# Patient Record
Sex: MALE | Race: WHITE | NOT HISPANIC OR LATINO | URBAN - METROPOLITAN AREA
[De-identification: names, ages, dates, MRNs, and addresses within clinical notes are randomized per-mention and may not be internally consistent; named-entity substitution may affect disease eponyms.]

---

## 2019-09-15 ENCOUNTER — EMERGENCY (EMERGENCY)
Facility: HOSPITAL | Age: 64
LOS: 0 days | Discharge: HOME | End: 2019-09-15
Payer: COMMERCIAL

## 2019-09-15 VITALS
TEMPERATURE: 98 F | HEART RATE: 88 BPM | SYSTOLIC BLOOD PRESSURE: 135 MMHG | HEIGHT: 67 IN | WEIGHT: 210.1 LBS | OXYGEN SATURATION: 97 % | RESPIRATION RATE: 16 BRPM | DIASTOLIC BLOOD PRESSURE: 87 MMHG
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
## 2019-09-15 DIAGNOSIS — S42.402A UNSPECIFIED FRACTURE OF LOWER END OF LEFT HUMERUS, INITIAL ENCOUNTER FOR CLOSED FRACTURE: ICD-10-CM

## 2019-09-15 DIAGNOSIS — I10 ESSENTIAL (PRIMARY) HYPERTENSION: ICD-10-CM

## 2019-09-15 DIAGNOSIS — M79.603 PAIN IN ARM, UNSPECIFIED: ICD-10-CM
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
## 2019-09-15 PROCEDURE — 73060 X-RAY EXAM OF HUMERUS: CPT | Mod: 26,LT

## 2019-09-15 PROCEDURE — 73090 X-RAY EXAM OF FOREARM: CPT | Mod: 26,LT

## 2019-09-15 PROCEDURE — 73070 X-RAY EXAM OF ELBOW: CPT | Mod: 26,76,LT

## 2019-09-15 PROCEDURE — 99284 EMERGENCY DEPT VISIT MOD MDM: CPT

## 2019-09-15 RX ORDER — HYDROMORPHONE HYDROCHLORIDE 2 MG/ML
1 INJECTION INTRAMUSCULAR; INTRAVENOUS; SUBCUTANEOUS ONCE
Refills: 0 | Status: DISCONTINUED | OUTPATIENT
Start: 2019-09-15 | End: 2019-09-15

## 2019-09-15 RX ORDER — DIAZEPAM 5 MG
5 TABLET ORAL ONCE
Refills: 0 | Status: DISCONTINUED | OUTPATIENT
Start: 2019-09-15 | End: 2019-09-15

## 2019-09-15 RX ORDER — OXYCODONE AND ACETAMINOPHEN 5; 325 MG/1; MG/1
2 TABLET ORAL ONCE
Refills: 0 | Status: DISCONTINUED | OUTPATIENT
Start: 2019-09-15 | End: 2019-09-15

## 2019-09-15 RX ADMIN — Medication 5 MILLIGRAM(S): at 16:44

## 2019-09-15 RX ADMIN — OXYCODONE AND ACETAMINOPHEN 2 TABLET(S): 5; 325 TABLET ORAL at 17:30

## 2019-09-15 RX ADMIN — OXYCODONE AND ACETAMINOPHEN 2 TABLET(S): 5; 325 TABLET ORAL at 15:42

## 2019-09-15 NOTE — ED PROVIDER NOTE - OBJECTIVE STATEMENT
65 y/o male with PMH HTN here c/o L elbow pain s/p mechanical fall x 2 hours ago. pt states he tripped and fell onto his elbow while playing racket ball. no head strike. he was seen by ortho DR Yanez who sent him to the ed. no change in sensation. no fevers. pt denies any other injury. has no other complaints    ROS: No fever/chills. No eye pain/changes in vision, No ear pain/sore throat/dysphagia, No chest pain/palpitations. No SOB/cough/. No abdominal pain, N/V/D, no black/bloody bm. No dysuria/frequency/discharge, No headache. No Dizziness.    No rashes or breaks in skin. No numbness/tingling/weakness. 63 y/o male with PMH HTN here c/o L elbow pain s/p mechanical fall x 2 hours ago. pt states he tripped and fell onto his elbow while playing racket ball. no head strike. he was seen by ortho DR Yanez who sent him to the ed. no change in sensation. no fevers. utd with tetanus pt denies any other injury. has no other complaints    ROS: No fever/chills. No eye pain/changes in vision, No ear pain/sore throat/dysphagia, No chest pain/palpitations. No SOB/cough/. No abdominal pain, N/V/D, no black/bloody bm. No dysuria/frequency/discharge, No headache. No Dizziness.    +abrasion No numbness/tingling/weakness.

## 2019-09-15 NOTE — ED PROVIDER NOTE - PATIENT PORTAL LINK FT
You can access the FollowMyHealth Patient Portal offered by [facility] by registering at the following website: [email or web address]. By joining [facility]’s FollowMyHealth portal, you will also be able to view your health information using other applications (apps) compatible with our system.

## 2019-09-15 NOTE — CONSULT NOTE ADULT - SUBJECTIVE AND OBJECTIVE BOX
64y Male RHD presents c/o right elbow pain s/p mechanical fall. Denies HS/LOC. Denies numbness/tingling. Denies fever/chills. Denies pain/injury elsewhere. No other complaints.    Allergies  morphine (Other)  Vicodin (Other)    Intolerances      Vital Signs Last 24 Hrs  T(C): 36.7 (09-15-19 @ 15:13), Max: 36.7 (09-15-19 @ 15:13)  T(F): 98 (09-15-19 @ 15:13), Max: 98 (09-15-19 @ 15:13)  HR: 88 (09-15-19 @ 15:13) (88 - 88)  BP: 135/87 (09-15-19 @ 15:13) (135/87 - 135/87)  RR: 16 (09-15-19 @ 15:13) (16 - 16)  SpO2: 97% (09-15-19 @ 15:13) (97% - 97%)    Physical Exam  Gen: NAD, Alert and Awake, Follows Commands  Rue:   Skin abrasion over lateral aspect of elbow, moderate swelling to elbow noted.   Cannot AROM elbow due to pain  +Mechanical block to flexion/extension/supination  +AIN/PIN/R/M/U  SILT C5-T1  +Rad pulses BL  compartments compressible, no pain w/ passive stretch      Imaging:    Secondary: Patient is a 64y RHD Male presents to ED complaining of left elbow pain s/p mechanical fall. Patient was seen outpatient w/ Dr. Yanez who sent the patient into the ED. Patient fell onto his elbow while playing racket ball. Denies HS/LOC. Denies numbness/tingling. Denies fever/chills. Denies pain/injury elsewhere. No other complaints.    Allergies  morphine (Other)  Vicodin (Other)  Intolerances    Vital Signs Last 24 Hrs  T(C): 36.7 (09-15-19 @ 15:13), Max: 36.7 (09-15-19 @ 15:13)  T(F): 98 (09-15-19 @ 15:13), Max: 98 (09-15-19 @ 15:13)  HR: 88 (09-15-19 @ 15:13) (88 - 88)  BP: 135/87 (09-15-19 @ 15:13) (135/87 - 135/87)  RR: 16 (09-15-19 @ 15:13) (16 - 16)  SpO2: 97% (09-15-19 @ 15:13) (97% - 97%)    Physical Exam  Gen: NAD, Alert and Awake, Follows Commands  LUE:   Skin abrasion over lateral aspect of elbow, moderate swelling to elbow noted.   Cannot AROM elbow due to pain/restriction  +Mechanical block at elbow to flexion/extension/supination  +AIN/PIN/R/M/U  SILT C5-T1  +Rad pulses BL  compartments compressible, no pain w/ passive stretch    Secondary:  Able to SLR BL. Negative log roll b/l. Ambulating without assistance. Full A/PROM of other extremities. No TTP of bony prominences elsewhere.     Imaging:  Xray left elbow/forearm/humerus demonstrates displaced proximal olecranon fracture and dislocated radial head.    Procedure:  After obtaining verbal consent, closed reduction was performed and a well padded plaster splint was applied. All bony prominences were well padded. The patient tolerated the procedure well and there we no complications. The patient was neurovascularly intact following reduction. Post-reduction xrays demonstrated reduction of the radial head and improved alignment of the proximal olecranon.

## 2019-09-15 NOTE — ED PROVIDER NOTE - CLINICAL SUMMARY MEDICAL DECISION MAKING FREE TEXT BOX
pt here with L displaced olecranon fx, pain control, reduced and splinted by ortho, as per ortho pt ok for dc with Dr krista walters oupt, educated re fu needs and return precautions given, ok with dc

## 2019-09-15 NOTE — CONSULT NOTE ADULT - ASSESSMENT
A/P:   64y Male      INCOMPLETE NOTE********************************      -Pain control  -NWB RUE in posterior slab  -Keep splint c/d/i  -Sling at all times  -Ice/elevate RUE  -Active movement of fingers/elbow encouraged  -Possible need for surgical intervention discussed with patient. All question answered. Patient understands and voiced agreement w/ above plan  -Follow up with Dr. Romero as outpatient within 1 week, call office for appointment   -Will discuss w/ attending and advise if plan changes A/P:   64y Male w/ left displaced proximal olecranon fracture and dislocated radial head.    -Pain control  -NWB LUE in posterior slab & sling  -Keep splint c/d/i  -Sling at all times  -Ice/elevate RUE  -Active movement of fingers/elbow encouraged  -Possible need for surgical intervention discussed with patient. All question answered. Patient understands and voiced agreement w/ above plan  -Follow up with Dr. Romero as outpatient within 1 week, call office for appointment   -Will discuss w/ attending and advise if plan changes

## 2019-09-15 NOTE — ED ADULT NURSE NOTE - NSIMPLEMENTINTERV_GEN_ALL_ED
Implemented All Universal Safety Interventions:  [place] to call system. Call bell, personal items and telephone within reach. Instruct patient to call for assistance. Room bathroom lighting operational. Non-slip footwear when patient is off stretcher. Physically safe environment: no spills, clutter or unnecessary equipment. Stretcher in lowest position, wheels locked, appropriate side rails in place.

## 2019-09-15 NOTE — ED PROVIDER NOTE - PHYSICAL EXAMINATION
Gen: Alert, moderate distress from pain, not toxic  Head: NC, AT, PERRL, EOMI, normal lids/conjunctiva  ENT: B TM WNL, normal hearing, patent oropharynx without erythema/exudate, uvula midline  Neck: +supple, no tenderness/meningismus/JVD, +Trachea midline  Pulm: Bilateral BS, normal resp effort, no wheeze/stridor/retractions  CV: RRR, no M/R/G, +dist pulses  Abd: soft, NT/ND, +BS, no hepatosplenomegaly  Mskel: no erythema/cyanosis +tenderness and swelling to L elbow with abrasion laterally. limited rom due to pain, holding arm in flexion, sensations intact, able to make fist, good pulses,   Skin: no rash  Neuro: AAOx3, no sensory/motor deficits, CN 2-12 intact

## 2019-09-23 PROBLEM — I10 ESSENTIAL (PRIMARY) HYPERTENSION: Chronic | Status: ACTIVE | Noted: 2019-09-15

## 2019-09-24 ENCOUNTER — TRANSCRIPTION ENCOUNTER (OUTPATIENT)
Age: 64
End: 2019-09-24

## 2019-09-25 ENCOUNTER — OUTPATIENT (OUTPATIENT)
Dept: OUTPATIENT SERVICES | Facility: HOSPITAL | Age: 64
LOS: 1 days | Discharge: HOME | End: 2019-09-25

## 2019-09-25 VITALS
HEART RATE: 90 BPM | RESPIRATION RATE: 15 BRPM | DIASTOLIC BLOOD PRESSURE: 81 MMHG | SYSTOLIC BLOOD PRESSURE: 111 MMHG | OXYGEN SATURATION: 98 %

## 2019-09-25 VITALS
RESPIRATION RATE: 16 BRPM | HEIGHT: 66 IN | TEMPERATURE: 98 F | DIASTOLIC BLOOD PRESSURE: 76 MMHG | SYSTOLIC BLOOD PRESSURE: 131 MMHG | HEART RATE: 68 BPM | OXYGEN SATURATION: 99 % | WEIGHT: 214.95 LBS

## 2019-09-25 DIAGNOSIS — S42.302A UNSPECIFIED FRACTURE OF SHAFT OF HUMERUS, LEFT ARM, INITIAL ENCOUNTER FOR CLOSED FRACTURE: ICD-10-CM

## 2019-09-25 DIAGNOSIS — Z98.890 OTHER SPECIFIED POSTPROCEDURAL STATES: Chronic | ICD-10-CM

## 2019-09-25 RX ORDER — ONDANSETRON 8 MG/1
4 TABLET, FILM COATED ORAL
Refills: 0 | Status: DISCONTINUED | OUTPATIENT
Start: 2019-09-25 | End: 2019-09-25

## 2019-09-25 RX ORDER — SODIUM CHLORIDE 9 MG/ML
1000 INJECTION, SOLUTION INTRAVENOUS
Refills: 0 | Status: DISCONTINUED | OUTPATIENT
Start: 2019-09-25 | End: 2019-09-25

## 2019-09-25 RX ORDER — HYDROMORPHONE HYDROCHLORIDE 2 MG/ML
0.5 INJECTION INTRAMUSCULAR; INTRAVENOUS; SUBCUTANEOUS
Refills: 0 | Status: DISCONTINUED | OUTPATIENT
Start: 2019-09-25 | End: 2019-09-25

## 2019-09-25 RX ORDER — HYDROMORPHONE HYDROCHLORIDE 2 MG/ML
1 INJECTION INTRAMUSCULAR; INTRAVENOUS; SUBCUTANEOUS
Qty: 28 | Refills: 0
Start: 2019-09-25 | End: 2019-10-01

## 2019-09-25 RX ORDER — HYDROMORPHONE HYDROCHLORIDE 2 MG/ML
1 INJECTION INTRAMUSCULAR; INTRAVENOUS; SUBCUTANEOUS
Qty: 0 | Refills: 0 | DISCHARGE

## 2019-09-25 RX ORDER — HYDROMORPHONE HYDROCHLORIDE 2 MG/ML
1 INJECTION INTRAMUSCULAR; INTRAVENOUS; SUBCUTANEOUS
Refills: 0 | Status: DISCONTINUED | OUTPATIENT
Start: 2019-09-25 | End: 2019-09-25

## 2019-09-25 RX ADMIN — HYDROMORPHONE HYDROCHLORIDE 0.5 MILLIGRAM(S): 2 INJECTION INTRAMUSCULAR; INTRAVENOUS; SUBCUTANEOUS at 18:20

## 2019-09-25 RX ADMIN — SODIUM CHLORIDE 140 MILLILITER(S): 9 INJECTION, SOLUTION INTRAVENOUS at 18:00

## 2019-09-25 RX ADMIN — HYDROMORPHONE HYDROCHLORIDE 0.5 MILLIGRAM(S): 2 INJECTION INTRAMUSCULAR; INTRAVENOUS; SUBCUTANEOUS at 18:35

## 2019-09-25 NOTE — H&P PST ADULT - ASSESSMENT
64M presents with left elbow pain 2/2 elbow dislocation and fracture s/p mechanical fall while playing paddle ball  scheduled for surgery today

## 2019-09-25 NOTE — H&P PST ADULT - HISTORY OF PRESENT ILLNESS
64M with PMH HTN, HLD, BPH and PSH Umbilical hernia repair with mesh presents with left elbow pain 2/2 elbow dislocation and fx, scheduled for elbow surgery with Dr. Romero s/p mechanical fall onto his elbow while playing paddle ball last sunday. Pt was seen and evaluated by Dr. Romero in the office. Offers no other complaints. Was seen by his PMD on 9/23 and medically cleared for surgery.

## 2019-09-25 NOTE — ASU DISCHARGE PLAN (ADULT/PEDIATRIC) - CARE PROVIDER_API CALL
Tim Romero)  Orthopaedic Surgery  [address] [place] [address]  Phone: [phone]  Fax: [phone]  Follow Up Time:

## 2019-09-25 NOTE — ASU DISCHARGE PLAN (ADULT/PEDIATRIC) - ASU DC SPECIAL INSTRUCTIONSFT
no alcohol with pain medication  encourage fluids daily    nonweight bearing left arm  maintain in splint/sling, keep clean dry and intact  FU with Dr. Romero in 7-10 days

## 2019-09-25 NOTE — H&P PST ADULT - ATTENDING COMMENTS
dx: monteggia fracture. in need of orif ulna. radial head appears OK. discussed that if unstable after fixation, will reevaluate fixation. if good, may need ligament repair  Risks, benefits and alternatives discussed with the patient at length. Risks include bleeding, infection, malunion, nonunion, hardware failure, injury to nerves, vessels or tendons, pain, stiffness(a likely scenario esecially with the dislocation.  may need release), need for future surgery, loss of function, loss of limb, loss of life.  We discussed the operative plan and post op expectations.  The patient had the opportunity to ask questions. All questions were answered. The patient signed consent of their own accord.

## 2019-09-25 NOTE — BRIEF OPERATIVE NOTE - OPERATION/FINDINGS
left monteggia fx (proximal ulna fx with radial head dislocation  s/p ORIF of left ulna with 2 plates, 2 headless screws for radial head fx

## 2019-09-25 NOTE — ASU DISCHARGE PLAN (ADULT/PEDIATRIC) - CALL YOUR DOCTOR IF YOU HAVE ANY OF THE FOLLOWING:
Numbness, tingling, color or temperature change to extremity/Wound/Surgical Site with redness, or foul smelling discharge or pus

## 2019-09-25 NOTE — H&P PST ADULT - NSICDXPROBLEM_GEN_ALL_CORE_FT
PROBLEM DIAGNOSES  Problem: Arm fracture, left, closed, initial encounter  Assessment and Plan:   Scheduled for surgical repair today

## 2019-09-30 DIAGNOSIS — E66.9 OBESITY, UNSPECIFIED: ICD-10-CM

## 2019-09-30 DIAGNOSIS — S42.402A UNSPECIFIED FRACTURE OF LOWER END OF LEFT HUMERUS, INITIAL ENCOUNTER FOR CLOSED FRACTURE: ICD-10-CM
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
## 2019-09-30 DIAGNOSIS — E78.5 HYPERLIPIDEMIA, UNSPECIFIED: ICD-10-CM

## 2019-09-30 DIAGNOSIS — Z87.891 PERSONAL HISTORY OF NICOTINE DEPENDENCE: ICD-10-CM
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
## 2019-09-30 DIAGNOSIS — I10 ESSENTIAL (PRIMARY) HYPERTENSION: ICD-10-CM

## 2019-09-30 DIAGNOSIS — N40.0 BENIGN PROSTATIC HYPERPLASIA WITHOUT LOWER URINARY TRACT SYMPTOMS: ICD-10-CM
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
## 2020-05-29 NOTE — ED ADULT NURSE NOTE - HOW OFTEN DO YOU HAVE A DRINK CONTAINING ALCOHOL?
Primary Care Telemedicine Note  The patient location is:  Patient Home   The chief complaint leading to consultation is: Follow up of medical problems.        Visit type: Virtual visit with synchronous audio only and video  Each patient to whom he or she provides medical services by telemedicine is:  (1) informed of the relationship between the physician and patient and the respective role of any other health care provider with respect to management of the patient; and (2) notified that he or she may decline to receive medical services by telemedicine and may withdraw from such care at any time.    Updated/ annual due 5/21:  HM:  No fluvax, 5/20 TDaP, no mmg/gyn in 3y, no Cscope.       HPI:  Taking flonase.  Nasal drainage, but no more congestion.  Some nausea with pndrainage.  Tolerating prozac low dose, but still too nervous to drive.  Her boss wants her to return to work, and she has no ride and unable to drive.  Doing some walking for exercise.  No f/c/sw/cough.  No cp/sob/palp.    Sometimes trouble sleeping.      Problem List Items Addressed This Visit     Allergic rhinitis    Anxiety - Primary    Relevant Medications    FLUoxetine 20 MG capsule      Other Visit Diagnoses     Primary insomnia              The patient's Health Maintenance was reviewed and the following appears to be due at this time:  Health Maintenance Due   Topic Date Due    Hepatitis C Screening  1963    Mammogram  10/01/2003    Colonoscopy  10/01/2013       [unfilled]  Outpatient Medications Prior to Visit   Medication Sig Dispense Refill    fluticasone propionate (FLONASE) 50 mcg/actuation nasal spray 2 sprays (100 mcg total) by Each Nostril route once daily. 16 g 6    omeprazole (PRILOSEC) 20 MG capsule Take 20 mg by mouth once daily.      triamcinolone acetonide 0.025% (KENALOG) 0.025 % Oint Apply topically 2 (two) times daily. for 10 days 15 g 1    FLUoxetine 10 MG capsule Take 1 capsule (10 mg total) by mouth once daily.  30 capsule 5     No facility-administered medications prior to visit.         Physical Exam   No flowsheet data found.  No flowsheet data found.      Constitutional: The patient appears well-developed and well-nourished. No distress.   Psychiatric: The mood appears not anxious and the affect is not angry, not blunt, not labile and not inappropriate. Speech is not rapid and/or pressured, not delayed, not tangential and not slurred. The patient is not agitated, not aggressive, is not hyperactive, not slowed, not withdrawn, not actively hallucinating and not combative. Thought content is not paranoid and not delusional. Cognition and memory are not impaired. The patient does not express impulsivity or inappropriate judgment and does not exhibit a depressed mood. The patient expresses no homicidal and no suicidal ideation and has no suicidal plans and no homicidal plans. The patient is communicative and exhibits a normal recent memory and normal remote memory. The patient is attentive.     Encounter Diagnoses   Name Primary?    Anxiety Yes    Seasonal allergic rhinitis due to pollen     Primary insomnia        PLAN:    I have changed Maria M Green's FLUoxetine. I am also having her maintain her omeprazole, fluticasone propionate, and triamcinolone acetonide 0.025%.    Diagnoses and all orders for this visit:    Anxiety- not ready to return to work, increase to 20mg.  Reassess 1mo.  Letter written for work excuse.   -     FLUoxetine 20 MG capsule; Take 1 capsule (20 mg total) by mouth once daily.    Seasonal allergic rhinitis due to pollen- cont flonase, add benadryl 25-50mg at hs.    Primary insomnia- start benadryl 25-50mg at hs.        Medications Ordered This Encounter   Medications    FLUoxetine 20 MG capsule     Sig: Take 1 capsule (20 mg total) by mouth once daily.     Dispense:  30 capsule     Refill:  5     Medications Ordered This Encounter   Medications    FLUoxetine 20 MG capsule     Sig: Take 1  capsule (20 mg total) by mouth once daily.     Dispense:  30 capsule     Refill:  5     No orders of the defined types were placed in this encounter.     Monthly or less

## 2022-05-16 PROBLEM — Z00.00 ENCOUNTER FOR PREVENTIVE HEALTH EXAMINATION: Status: ACTIVE | Noted: 2022-05-16

## 2022-05-17 ENCOUNTER — APPOINTMENT (OUTPATIENT)
Dept: ORTHOPEDIC SURGERY | Facility: CLINIC | Age: 67
End: 2022-05-17
Payer: COMMERCIAL

## 2022-05-17 VITALS — WEIGHT: 183 LBS | BODY MASS INDEX: 28.72 KG/M2 | HEIGHT: 67 IN

## 2022-05-17 PROBLEM — E78.5 HYPERLIPIDEMIA, UNSPECIFIED: Chronic | Status: ACTIVE | Noted: 2019-09-25

## 2022-05-17 PROBLEM — N40.0 BENIGN PROSTATIC HYPERPLASIA WITHOUT LOWER URINARY TRACT SYMPTOMS: Chronic | Status: ACTIVE | Noted: 2019-09-25

## 2022-05-17 PROCEDURE — 73130 X-RAY EXAM OF HAND: CPT | Mod: RT

## 2022-05-17 PROCEDURE — 99213 OFFICE O/P EST LOW 20 MIN: CPT

## 2022-05-17 RX ORDER — DICLOFENAC SODIUM 1% 10 MG/G
1 GEL TOPICAL DAILY
Qty: 1 | Refills: 0 | Status: ACTIVE | COMMUNITY
Start: 2022-05-17 | End: 1900-01-01

## 2022-05-17 NOTE — HISTORY OF PRESENT ILLNESS
[5] : 5 [4] : 4 [Burning] : burning [Dull/Aching] : dull/aching [Localized] : localized [Retired] : Work status: retired [de-identified] : 5/17/22:  Pt has a painful mass at the base of the right thumb [] : Post Surgical Visit: no [FreeTextEntry1] : right hand [FreeTextEntry3] : a year ago [FreeTextEntry5] : no recent injury. Patient complains about ongoing discomfort and pain in the right hand for the past year. Patient had wrist surgery 15 years ago. Pain has increased overtime. [de-identified] : 15 years ago

## 2022-05-17 NOTE — PHYSICAL EXAM
[de-identified] : right thumb base with shoulder deformity at the cmc.  TTP and positive basal grind at the cmc.\par median/ulnar/radial serve sensation intact\par ain/pin/ulnar motor intact\par palpable pulsres\par CR<2s\par full active range of motion otherwise\par

## 2022-05-17 NOTE — ASSESSMENT
[FreeTextEntry1] : The patient was advised of the diagnosis. The natural history of the pathology was explained in full to the patient in layman's terms. We reviewed that the forces applied to the thumb tip are magnified 12-14 times at the thumb cmc joint.  We also reviewed the progression of arthritis with early arthritis showing minimal to no xray changes.  We discussed treatment options, including activity modification(demonstrated), medicine(topical and oral), bracing, therapy, injection and surgery.  We reviewed the r/b of each and post op expectations/course.  All questions were answered and the patient verbalized understanding\par

## 2022-06-14 ENCOUNTER — APPOINTMENT (OUTPATIENT)
Dept: ORTHOPEDIC SURGERY | Facility: CLINIC | Age: 67
End: 2022-06-14
Payer: COMMERCIAL

## 2022-06-14 VITALS — BODY MASS INDEX: 28.72 KG/M2 | WEIGHT: 183 LBS | HEIGHT: 67 IN

## 2022-06-14 PROCEDURE — 20605 DRAIN/INJ JOINT/BURSA W/O US: CPT

## 2022-06-14 PROCEDURE — 99214 OFFICE O/P EST MOD 30 MIN: CPT | Mod: 25

## 2022-06-14 NOTE — REVIEW OF SYSTEMS
[Arthralgia] : no arthralgia [Joint Pain] : joint pain [Joint Stiffness] : no joint stiffness [Joint Swelling] : joint swelling [Negative] : Heme/Lymph

## 2022-06-14 NOTE — HISTORY OF PRESENT ILLNESS
[Gradual] : gradual [Dull/Aching] : dull/aching [Throbbing] : throbbing [Household chores] : household chores [Leisure] : leisure [Sleep] : sleep [Meds] : meds [Retired] : Work status: retired [de-identified] : 6/14/22:  Pt is still having some discomfort at the base of 1st CMC\par \par 5/17/22:  Pt has a painful mass at the base of the right thumb [5] : 5 [4] : 4 [Burning] : burning [Localized] : localized [] : Post Surgical Visit: no [FreeTextEntry1] : right hand [FreeTextEntry3] : a year ago [FreeTextEntry5] : no recent injury. Patient complains about ongoing discomfort and pain in the right hand for the past year. Patient had wrist surgery 15 years ago. Pain has increased overtime. [FreeTextEntry9] : Brace [de-identified] : motion [de-identified] : 15 years ago [de-identified] : Patient has been wearing a brace and taking medication but still feels discomfort in the right hand.

## 2022-06-14 NOTE — PHYSICAL EXAM
[de-identified] : right thumb base with shoulder deformity at the cmc.  TTP and positive basal grind at the cmc.\par median/ulnar/radial serve sensation intact\par ain/pin/ulnar motor intact\par palpable pulsres\par CR<2s\par full active range of motion otherwise\par

## 2022-06-14 NOTE — IMAGING
[Right] : right hand [Degenerative change] : Degenerative change [FreeTextEntry1] : 1st CMC arthritis

## 2022-06-14 NOTE — ASSESSMENT
[FreeTextEntry1] : 6/14/22:  After a discussion of the risks, benefits and alternatives along with the expectations, the patient was amenable to injection.  The indication for injection is pain, inflammation and radiographically confirmed arthritis.  The skin overlying the carpometacarpal joint was prepared with alcohol and ethyl chloride was sprayed topically.  Sterile technique was used. An injection of 1ml of lidocaine and 6mg of betamethasone was used.  The patient was instructed to call if redness, pain or fever occur.  They may apply ice for 15 minutes eery hour for the next 12-24 hours as tolerated.  .  The patient understands that it may take 2-5 days to see a noticeable difference.  Sterile Band-Aid was applied.\par \par \par 5/17/22:  The patient was advised of the diagnosis. The natural history of the pathology was explained in full to the patient in layman's terms. We reviewed that the forces applied to the thumb tip are magnified 12-14 times at the thumb cmc joint.  We also reviewed the progression of arthritis with early arthritis showing minimal to no xray changes.  We discussed treatment options, including activity modification(demonstrated), medicine(topical and oral), bracing, therapy, injection and surgery.  We reviewed the r/b of each and post op expectations/course.  All questions were answered and the patient verbalized understanding\par

## 2022-07-20 ENCOUNTER — APPOINTMENT (OUTPATIENT)
Dept: ORTHOPEDIC SURGERY | Facility: CLINIC | Age: 67
End: 2022-07-20

## 2022-07-20 DIAGNOSIS — M18.11 UNILATERAL PRIMARY OSTEOARTHRITIS OF FIRST CARPOMETACARPAL JOINT, RIGHT HAND: ICD-10-CM

## 2022-07-20 PROCEDURE — 99214 OFFICE O/P EST MOD 30 MIN: CPT

## 2022-07-20 NOTE — ASSESSMENT
[FreeTextEntry1] : The patient was advised of the diagnosis. The natural history of the pathology was explained in full to the patient in layman's terms. We reviewed that the forces applied to the thumb tip are magnified 12-14 times at the thumb cmc joint.  We also reviewed the progression of arthritis with early arthritis showing minimal to no xray changes.  We discussed treatment options, including activity modification(demonstrated), medicine(topical and oral), bracing, therapy, injection and surgery.  We reviewed the r/b of each and post op expectations/course.  All questions were answered and the patient verbalized understanding

## 2022-07-20 NOTE — HISTORY OF PRESENT ILLNESS
[de-identified] : 6/14/22:  Pt is still having some discomfort at the base of 1st CMC\par \par 5/17/22:  Pt has a painful mass at the base of the right thumb [Gradual] : gradual [5] : 5 [4] : 4 [Burning] : burning [Dull/Aching] : dull/aching [Localized] : localized [Throbbing] : throbbing [Household chores] : household chores [Leisure] : leisure [Sleep] : sleep [Meds] : meds [Retired] : Work status: retired [] : Post Surgical Visit: no [FreeTextEntry1] : right hand [FreeTextEntry3] : a year ago [FreeTextEntry5] : no recent injury. Patient complains about ongoing discomfort and pain in the right hand for the past year. Patient had wrist surgery 15 years ago. Pain has increased overtime. [FreeTextEntry9] : Brace [de-identified] : motion [de-identified] : 15 years ago [de-identified] : Patient has been wearing a brace and taking medication but still feels discomfort in the right hand.

## 2022-07-20 NOTE — PHYSICAL EXAM
[de-identified] : 7/20/22: feels better after cmc injection 6/14/22. improved\par \par 6/14/22: right thumb base with shoulder deformity at the cmc.  TTP and positive basal grind at the cmc.\par median/ulnar/radial serve sensation intact\par ain/pin/ulnar motor intact\par palpable pulsres\par CR<2s\par full active range of motion otherwise\par

## 2022-07-20 NOTE — IMAGING
[de-identified] : right thumb base with shoulder deformity at the cmc.  TTP and positive basal grind at the cmc.\par median/ulnar/radial serve sensation intact\par ain/pin/ulnar motor intact\par palpable pulsres\par CR<2s\par full active range of motion otherwise\par \par

## 2022-10-25 ENCOUNTER — APPOINTMENT (OUTPATIENT)
Dept: ORTHOPEDIC SURGERY | Facility: CLINIC | Age: 67
End: 2022-10-25

## 2022-10-25 VITALS — HEIGHT: 67 IN | WEIGHT: 183 LBS | BODY MASS INDEX: 28.72 KG/M2

## 2022-10-25 DIAGNOSIS — Z78.9 OTHER SPECIFIED HEALTH STATUS: ICD-10-CM

## 2022-10-25 PROCEDURE — 73010 X-RAY EXAM OF SHOULDER BLADE: CPT | Mod: RT

## 2022-10-25 PROCEDURE — 99214 OFFICE O/P EST MOD 30 MIN: CPT

## 2022-10-25 PROCEDURE — 73030 X-RAY EXAM OF SHOULDER: CPT | Mod: RT

## 2022-10-25 NOTE — HISTORY OF PRESENT ILLNESS
[8] : 8 [5] : 5 [Sleep] : sleep [Meds] : meds [de-identified] : 10/25/22: 66 yo RHD male with right shoulder pain since 10/16/22. He states he tripped while getting off a boat and fell onto the dock. He states his shoulder dislocated and reduced when someone tried to lift him off the floor. He has pain at night. There is discomfort with raising his arm overhead. He ice and took ibuprofen 800mg. [] : no [FreeTextEntry1] : RT shoulder [FreeTextEntry5] : PT fell from 3 feet and fell onto his forearms. Pt states he felt she shoulder dislocate but when his friend went to help him up the shoulder relocated on its own. Pt now has a dull aching pain in his shoulder. Full ROM, light pain with movement. Pt states he feels a throbbing pain at night

## 2022-10-25 NOTE — PHYSICAL EXAM
[Right] : right shoulder [5 ___] : forward flexion 5[unfilled]/5 [5___] : internal rotation 5[unfilled]/5 [] : motor and sensory intact distally [FreeTextEntry9] : \par ER 60

## 2022-10-25 NOTE — ASSESSMENT
[FreeTextEntry1] : R shoulder strain, AC joint separation?\par Recommend Ice, rest.\par Continue ibuprofen prn.\par Consider MRI if symptoms worsen.\par RTO prn.

## 2022-12-13 ENCOUNTER — APPOINTMENT (OUTPATIENT)
Dept: ORTHOPEDIC SURGERY | Facility: CLINIC | Age: 67
End: 2022-12-13

## 2022-12-13 VITALS — BODY MASS INDEX: 29.03 KG/M2 | WEIGHT: 185 LBS | HEIGHT: 67 IN

## 2022-12-13 DIAGNOSIS — S46.911A STRAIN OF UNSPECIFIED MUSCLE, FASCIA AND TENDON AT SHOULDER AND UPPER ARM LEVEL, RIGHT ARM, INITIAL ENCOUNTER: ICD-10-CM

## 2022-12-13 DIAGNOSIS — I10 ESSENTIAL (PRIMARY) HYPERTENSION: ICD-10-CM

## 2022-12-13 DIAGNOSIS — E78.00 PURE HYPERCHOLESTEROLEMIA, UNSPECIFIED: ICD-10-CM

## 2022-12-13 PROCEDURE — 99213 OFFICE O/P EST LOW 20 MIN: CPT

## 2022-12-13 NOTE — HISTORY OF PRESENT ILLNESS
[5] : 5 [Sleep] : sleep [Meds] : meds [7] : 7 [Sharp] : sharp [Shooting] : shooting [Constant] : constant [Retired] : Work status: retired [de-identified] : 12/13/22: Here for follow up.  He reports continued pain despite ibuprofen, conservative treatment.  \par \par 10/25/22: 68 yo RHD male with right shoulder pain since 10/16/22. He states he tripped while getting off a boat and fell onto the dock. He states his shoulder dislocated and reduced when someone tried to lift him off the floor. He has pain at night. There is discomfort with raising his arm overhead. He ice and took ibuprofen 800mg. [] : no [FreeTextEntry1] : RT shoulder [FreeTextEntry5] : PT fell from 3 feet and fell onto his forearms. Pt states he felt she shoulder dislocate but when his friend went to help him up the shoulder relocated on its own. Pt now has a dull aching pain in his shoulder. Full ROM, light pain with movement. Pt states he feels a throbbing pain at night [FreeTextEntry9] : extra strength motrin [de-identified] : raising arm, lifting

## 2022-12-13 NOTE — ASSESSMENT
[FreeTextEntry1] : R shoulder strain, AC joint separation?\par Recommend Ice, rest.\par Continue ibuprofen prn.\par We will get an MRI to eval for a tear.\par RTO for review.\par \par

## 2022-12-15 ENCOUNTER — FORM ENCOUNTER (OUTPATIENT)
Age: 67
End: 2022-12-15

## 2022-12-16 ENCOUNTER — APPOINTMENT (OUTPATIENT)
Dept: MRI IMAGING | Facility: CLINIC | Age: 67
End: 2022-12-16

## 2022-12-16 PROCEDURE — 73221 MRI JOINT UPR EXTREM W/O DYE: CPT | Mod: RT

## 2022-12-28 ENCOUNTER — APPOINTMENT (OUTPATIENT)
Dept: ORTHOPEDIC SURGERY | Facility: CLINIC | Age: 67
End: 2022-12-28

## 2022-12-28 VITALS — BODY MASS INDEX: 29.03 KG/M2 | WEIGHT: 185 LBS | HEIGHT: 67 IN

## 2022-12-28 PROCEDURE — 99214 OFFICE O/P EST MOD 30 MIN: CPT | Mod: 57

## 2022-12-28 NOTE — ASSESSMENT
[FreeTextEntry1] : R shoulder pain. \par MRI reviewed: RCT, some signs of degeneration, LHB longitudinal tears, labral tearing\par Discussed op versus non op tx, including the r/b/a/c of both.\par Discussed rehab and recovery after SA, SAD, acromioplasty, synovectomy, GHD, RCR, biceps tenotomy.\par We will schedule surgery.\par \par

## 2022-12-28 NOTE — HISTORY OF PRESENT ILLNESS
[7] : 7 [5] : 5 [Sharp] : sharp [Shooting] : shooting [Constant] : constant [Sleep] : sleep [Meds] : meds [Retired] : Work status: retired [de-identified] : 12/28/22: Here to review MRI.\par \par MRI R shoulder:\par 1. AC joint arthrosis with no separation. Lateral acromial spur with narrowing of the humeroacromial interval which can be seen with impingement. Small 6-mm cyst at head of the acromion.\par 2. Infraspinatus tendinopathy and fraying with moderate-grade linear articular interstitial tears at the insertion.\par 3. Supraspinatus 12 x 9 mm full-thickness insertional tear with more extensive articular fraying. Fatty infiltration of the muscle. No atrophy.\par 4. Tear at superior labrum and anterior inferior labrum. Biceps tendinopathy, medial subluxation, tenosynovitis, and degenerative tear at bicipital groove through anchor.\par 5. Capsular thickening which can be seen with adhesive capsulitis.\par \par 12/13/22: Here for follow up.  He reports continued pain despite ibuprofen, conservative treatment.  \par \par 10/25/22: 68 yo RHD male with right shoulder pain since 10/16/22. He states he tripped while getting off a boat and fell onto the dock. He states his shoulder dislocated and reduced when someone tried to lift him off the floor. He has pain at night. There is discomfort with raising his arm overhead. He ice and took ibuprofen 800mg. [] : no [FreeTextEntry1] : RT shoulder [FreeTextEntry5] : PT fell from 3 feet and fell onto his forearms. Pt states he felt she shoulder dislocate but when his friend went to help him up the shoulder relocated on its own. Pt now has a dull aching pain in his shoulder. Full ROM, light pain with movement. Pt states he feels a throbbing pain at night [FreeTextEntry9] : extra strength motrin [de-identified] : raising arm, lifting

## 2022-12-28 NOTE — PHYSICAL EXAM
Health Maintenance Due   Topic Date Due   â¢ COVID-19 Vaccine (1) Never done   â¢ HPV Vaccine (2 - 2-dose series) 02/27/2020   â¢ Annual Physical (ages 2-20)  08/27/2020       Patient is due for topics as listed above but is not proceeding with Immunization(s) COVID-19 and HPV and Annual Wellness Visit (ages 2-20) at this time. Education provided for Immunization(s) COVID-19 and HPV. [Right] : right shoulder [5 ___] : forward flexion 5[unfilled]/5 [5___] : internal rotation 5[unfilled]/5 [] : motor and sensory intact distally [FreeTextEntry9] : \par ER 60

## 2022-12-28 NOTE — DATA REVIEWED
[MRI] : MRI [Right] : of the right [Shoulder] : shoulder [I independently reviewed and interpreted images and report] : I independently reviewed and interpreted images and report [FreeTextEntry1] : RCT, some signs of degeneration, LHB longitudinal tears, labral tearing

## 2023-01-11 ENCOUNTER — APPOINTMENT (OUTPATIENT)
Dept: ORTHOPEDIC SURGERY | Facility: CLINIC | Age: 68
End: 2023-01-11

## 2023-03-04 ENCOUNTER — LABORATORY RESULT (OUTPATIENT)
Age: 68
End: 2023-03-04

## 2023-03-08 RX ORDER — DOCUSATE SODIUM 100 MG/1
100 CAPSULE ORAL EVERY 8 HOURS
Qty: 30 | Refills: 0 | Status: ACTIVE | COMMUNITY
Start: 2023-03-08 | End: 1900-01-01

## 2023-03-08 RX ORDER — OXYCODONE 5 MG/1
5 TABLET ORAL
Qty: 35 | Refills: 0 | Status: ACTIVE | COMMUNITY
Start: 2023-03-08 | End: 1900-01-01

## 2023-03-09 ENCOUNTER — APPOINTMENT (OUTPATIENT)
Age: 68
End: 2023-03-09
Payer: COMMERCIAL

## 2023-03-09 PROCEDURE — 29820 SHO ARTHRS SRG PRTL SYNVCT: CPT | Mod: AS,59,RT

## 2023-03-09 PROCEDURE — 29826 SHO ARTHRS SRG DECOMPRESSION: CPT | Mod: RT

## 2023-03-09 PROCEDURE — 29823 SHO ARTHRS SRG XTNSV DBRDMT: CPT | Mod: AS,59,RT

## 2023-03-09 PROCEDURE — 29827 SHO ARTHRS SRG RT8TR CUF RPR: CPT | Mod: RT

## 2023-03-09 PROCEDURE — 29823 SHO ARTHRS SRG XTNSV DBRDMT: CPT | Mod: 59,RT

## 2023-03-09 PROCEDURE — 23405 INCISION OF TENDON & MUSCLE: CPT | Mod: 59,RT

## 2023-03-09 PROCEDURE — 29820 SHO ARTHRS SRG PRTL SYNVCT: CPT | Mod: 59,RT

## 2023-03-09 PROCEDURE — 23405 INCISION OF TENDON & MUSCLE: CPT | Mod: AS,59,RT

## 2023-03-09 PROCEDURE — 29826 SHO ARTHRS SRG DECOMPRESSION: CPT | Mod: AS,RT

## 2023-03-09 PROCEDURE — 29827 SHO ARTHRS SRG RT8TR CUF RPR: CPT | Mod: AS,RT

## 2023-03-22 ENCOUNTER — APPOINTMENT (OUTPATIENT)
Dept: ORTHOPEDIC SURGERY | Facility: CLINIC | Age: 68
End: 2023-03-22
Payer: COMMERCIAL

## 2023-03-22 VITALS — BODY MASS INDEX: 29.03 KG/M2 | HEIGHT: 67 IN | WEIGHT: 185 LBS

## 2023-03-22 PROCEDURE — 99024 POSTOP FOLLOW-UP VISIT: CPT

## 2023-03-22 NOTE — HISTORY OF PRESENT ILLNESS
[6] : 6 [3] : 3 [Sharp] : sharp [Constant] : constant [Sleep] : sleep [Meds] : meds [Retired] : Work status: retired [de-identified] : DOS 3/9/23: R SA, SAD, acromioplasty, synovectomy, GHD, RCR, biceps tenotomy.\par \par 3/22/23: Here for first PO visit. He has been in sling. There is some bruising down his arm.\par \par 12/28/22: Here to review MRI.\par \par MRI R shoulder:\par 1. AC joint arthrosis with no separation. Lateral acromial spur with narrowing of the humeroacromial interval which can be seen with impingement. Small 6-mm cyst at head of the acromion.\par 2. Infraspinatus tendinopathy and fraying with moderate-grade linear articular interstitial tears at the insertion.\par 3. Supraspinatus 12 x 9 mm full-thickness insertional tear with more extensive articular fraying. Fatty infiltration of the muscle. No atrophy.\par 4. Tear at superior labrum and anterior inferior labrum. Biceps tendinopathy, medial subluxation, tenosynovitis, and degenerative tear at bicipital groove through anchor.\par 5. Capsular thickening which can be seen with adhesive capsulitis.\par \par 12/13/22: Here for follow up.  He reports continued pain despite ibuprofen, conservative treatment.  \par \par 10/25/22: 68 yo RHD male with right shoulder pain since 10/16/22. He states he tripped while getting off a boat and fell onto the dock. He states his shoulder dislocated and reduced when someone tried to lift him off the floor. He has pain at night. There is discomfort with raising his arm overhead. He ice and took ibuprofen 800mg. [] : This patient has had an injection before: no [FreeTextEntry1] : RT shoulder [FreeTextEntry5] : PT fell from 3 feet and fell onto his forearms. Pt states he felt she shoulder dislocate but when his friend went to help him up the shoulder relocated on its own. Pt now has a dull aching pain in his shoulder. Full ROM, light pain with movement. Pt states he feels a throbbing pain at night [FreeTextEntry9] : extra strength motrin [de-identified] : raising arm, lifting [de-identified] : 3/9/23

## 2023-03-22 NOTE — ASSESSMENT
[FreeTextEntry1] : R shoulder pain. \par MRI reviewed: RCT, some signs of degeneration, LHB longitudinal tears, labral tearing\par Now S/P R SA, SAD, acromioplasty, synovectomy, GHD, RCR, biceps tenotomy.\par Sling for 2 more weeks.\par OK for elbow wrist and hand ROM.\par Ice.\par RTO 2 weeks.\par

## 2023-04-04 ENCOUNTER — APPOINTMENT (OUTPATIENT)
Dept: ORTHOPEDIC SURGERY | Facility: CLINIC | Age: 68
End: 2023-04-04
Payer: COMMERCIAL

## 2023-04-04 VITALS — WEIGHT: 185 LBS | BODY MASS INDEX: 29.03 KG/M2 | HEIGHT: 67 IN

## 2023-04-04 PROCEDURE — 99024 POSTOP FOLLOW-UP VISIT: CPT

## 2023-04-04 NOTE — ASSESSMENT
[FreeTextEntry1] : R shoulder pain. \par MRI reviewed: RCT, some signs of degeneration, LHB longitudinal tears, labral tearing\par Now S/P R SA, SAD, acromioplasty, synovectomy, GHD, RCR, biceps tenotomy.\par D/c sling.\par PT for PROM, AROM as tolerated.\par OK for light ADLs, but 1 lbs WB limit. \par RTO 4 weeks.\par

## 2023-04-04 NOTE — HISTORY OF PRESENT ILLNESS
[6] : 6 [3] : 3 [Sharp] : sharp [Constant] : constant [Sleep] : sleep [Meds] : meds [Retired] : Work status: retired [de-identified] : DOS 3/9/23: R SA, SAD, acromioplasty, synovectomy, GHD, RCR, biceps tenotomy.\par \par 3/22/23: Here for first PO visit. He has been in sling. There is some bruising down his arm.\par \par 12/28/22: Here to review MRI.\par \par MRI R shoulder:\par 1. AC joint arthrosis with no separation. Lateral acromial spur with narrowing of the humeroacromial interval which can be seen with impingement. Small 6-mm cyst at head of the acromion.\par 2. Infraspinatus tendinopathy and fraying with moderate-grade linear articular interstitial tears at the insertion.\par 3. Supraspinatus 12 x 9 mm full-thickness insertional tear with more extensive articular fraying. Fatty infiltration of the muscle. No atrophy.\par 4. Tear at superior labrum and anterior inferior labrum. Biceps tendinopathy, medial subluxation, tenosynovitis, and degenerative tear at bicipital groove through anchor.\par 5. Capsular thickening which can be seen with adhesive capsulitis.\par \par 12/13/22: Here for follow up.  He reports continued pain despite ibuprofen, conservative treatment.  \par \par 10/25/22: 66 yo RHD male with right shoulder pain since 10/16/22. He states he tripped while getting off a boat and fell onto the dock. He states his shoulder dislocated and reduced when someone tried to lift him off the floor. He has pain at night. There is discomfort with raising his arm overhead. He ice and took ibuprofen 800mg. [] : This patient has had an injection before: no [FreeTextEntry1] : RT shoulder [FreeTextEntry5] : PT fell from 3 feet and fell onto his forearms. Pt states he felt she shoulder dislocate but when his friend went to help him up the shoulder relocated on its own. Pt now has a dull aching pain in his shoulder. Full ROM, light pain with movement. Pt states he feels a throbbing pain at night [FreeTextEntry9] : extra strength motrin [de-identified] : raising arm, lifting [de-identified] : 3/9/23

## 2023-05-02 ENCOUNTER — APPOINTMENT (OUTPATIENT)
Dept: ORTHOPEDIC SURGERY | Facility: CLINIC | Age: 68
End: 2023-05-02

## 2023-05-09 ENCOUNTER — APPOINTMENT (OUTPATIENT)
Dept: ORTHOPEDIC SURGERY | Facility: CLINIC | Age: 68
End: 2023-05-09
Payer: COMMERCIAL

## 2023-05-09 VITALS — WEIGHT: 185 LBS | BODY MASS INDEX: 29.03 KG/M2 | HEIGHT: 67 IN

## 2023-05-09 DIAGNOSIS — Z78.9 OTHER SPECIFIED HEALTH STATUS: ICD-10-CM

## 2023-05-09 PROCEDURE — 99024 POSTOP FOLLOW-UP VISIT: CPT

## 2023-05-09 NOTE — HISTORY OF PRESENT ILLNESS
[6] : 6 [3] : 3 [Sharp] : sharp [Constant] : constant [Sleep] : sleep [Meds] : meds [Retired] : Work status: retired [de-identified] : DOS 3/9/23: R SA, SAD, acromioplasty, synovectomy, GHD, RCR, biceps tenotomy.\par \par 5/9/23: Here for 2 month follow up. He is in PT and doing great.\par \par 3/22/23: Here for first PO visit. He has been in sling. There is some bruising down his arm.\par \par 12/28/22: Here to review MRI.\par \par MRI R shoulder:\par 1. AC joint arthrosis with no separation. Lateral acromial spur with narrowing of the humeroacromial interval which can be seen with impingement. Small 6-mm cyst at head of the acromion.\par 2. Infraspinatus tendinopathy and fraying with moderate-grade linear articular interstitial tears at the insertion.\par 3. Supraspinatus 12 x 9 mm full-thickness insertional tear with more extensive articular fraying. Fatty infiltration of the muscle. No atrophy.\par 4. Tear at superior labrum and anterior inferior labrum. Biceps tendinopathy, medial subluxation, tenosynovitis, and degenerative tear at bicipital groove through anchor.\par 5. Capsular thickening which can be seen with adhesive capsulitis.\par \par 12/13/22: Here for follow up.  He reports continued pain despite ibuprofen, conservative treatment.  \par \par 10/25/22: 68 yo RHD male with right shoulder pain since 10/16/22. He states he tripped while getting off a boat and fell onto the dock. He states his shoulder dislocated and reduced when someone tried to lift him off the floor. He has pain at night. There is discomfort with raising his arm overhead. He ice and took ibuprofen 800mg. [] : This patient has had an injection before: no [FreeTextEntry5] : PT fell from 3 feet and fell onto his forearms. Pt states he felt she shoulder dislocate but when his friend went to help him up the shoulder relocated on its own. Pt now has a dull aching pain in his shoulder. Full ROM, light pain with movement. Pt states he feels a throbbing pain at night [FreeTextEntry1] : RT shoulder [FreeTextEntry9] : extra strength motrin [de-identified] : raising arm, lifting [de-identified] : 3/9/23

## 2023-05-09 NOTE — ASSESSMENT
[FreeTextEntry1] : R shoulder pain. \par MRI reviewed: RCT, some signs of degeneration, LHB longitudinal tears, labral tearing\par Now S/P R SA, SAD, acromioplasty, synovectomy, GHD, RCR, biceps tenotomy.\par PT for PROM, AROM as tolerated.\par OK for phase 1 strengthening.\par WB limit 10 lbs.\par RTO 6 weeks.\par

## 2023-06-13 ENCOUNTER — APPOINTMENT (OUTPATIENT)
Dept: ORTHOPEDIC SURGERY | Facility: CLINIC | Age: 68
End: 2023-06-13
Payer: COMMERCIAL

## 2023-06-13 PROCEDURE — 99213 OFFICE O/P EST LOW 20 MIN: CPT

## 2023-06-13 NOTE — HISTORY OF PRESENT ILLNESS
[de-identified] : DOS 3/9/23: R SA, SAD, acromioplasty, synovectomy, GHD, RCR, biceps tenotomy.\par \par 6/13/23: Here for 3 month follow up.  He is in PT with improvement.  \par \par 5/9/23: Here for 2 month follow up. He is in PT and doing great.\par \par 3/22/23: Here for first PO visit. He has been in sling. There is some bruising down his arm.\par \par 12/28/22: Here to review MRI.\par \par MRI R shoulder:\par 1. AC joint arthrosis with no separation. Lateral acromial spur with narrowing of the humeroacromial interval which can be seen with impingement. Small 6-mm cyst at head of the acromion.\par 2. Infraspinatus tendinopathy and fraying with moderate-grade linear articular interstitial tears at the insertion.\par 3. Supraspinatus 12 x 9 mm full-thickness insertional tear with more extensive articular fraying. Fatty infiltration of the muscle. No atrophy.\par 4. Tear at superior labrum and anterior inferior labrum. Biceps tendinopathy, medial subluxation, tenosynovitis, and degenerative tear at bicipital groove through anchor.\par 5. Capsular thickening which can be seen with adhesive capsulitis.\par \par 12/13/22: Here for follow up.  He reports continued pain despite ibuprofen, conservative treatment.  \par \par 10/25/22: 68 yo RHD male with right shoulder pain since 10/16/22. He states he tripped while getting off a boat and fell onto the dock. He states his shoulder dislocated and reduced when someone tried to lift him off the floor. He has pain at night. There is discomfort with raising his arm overhead. He ice and took ibuprofen 800mg.

## 2023-06-13 NOTE — PHYSICAL EXAM
[Right] : right shoulder [] : healed incision [5 ___] : forward flexion 5[unfilled]/5 [5___] : external rotation 5[unfilled]/5 [FreeTextEntry9] : \par Er 50

## 2023-06-13 NOTE — ASSESSMENT
[FreeTextEntry1] : R shoulder pain. \par MRI reviewed: RCT, some signs of degeneration, LHB longitudinal tears, labral tearing\par Now S/P R SA, SAD, acromioplasty, synovectomy, GHD, RCR, biceps tenotomy.\par PT for PROM, AROM as tolerated.\par Strengthening as tolerated.\par RTO 3 months.\par \par

## 2023-09-15 ENCOUNTER — APPOINTMENT (OUTPATIENT)
Dept: ORTHOPEDIC SURGERY | Facility: CLINIC | Age: 68
End: 2023-09-15
Payer: COMMERCIAL

## 2023-09-15 VITALS — BODY MASS INDEX: 29.03 KG/M2 | HEIGHT: 67 IN | WEIGHT: 185 LBS

## 2023-09-15 PROCEDURE — 73502 X-RAY EXAM HIP UNI 2-3 VIEWS: CPT

## 2023-09-15 PROCEDURE — 99214 OFFICE O/P EST MOD 30 MIN: CPT | Mod: 25

## 2023-09-15 PROCEDURE — 20552 NJX 1/MLT TRIGGER POINT 1/2: CPT

## 2023-09-15 PROCEDURE — J3490M: CUSTOM

## 2023-09-15 RX ORDER — IBUPROFEN 800 MG/1
800 TABLET, FILM COATED ORAL 4 TIMES DAILY
Qty: 120 | Refills: 0 | Status: ACTIVE | COMMUNITY
Start: 2023-09-15 | End: 1900-01-01

## 2023-09-19 ENCOUNTER — APPOINTMENT (OUTPATIENT)
Dept: ORTHOPEDIC SURGERY | Facility: CLINIC | Age: 68
End: 2023-09-19

## 2023-09-21 ENCOUNTER — APPOINTMENT (OUTPATIENT)
Dept: ORTHOPEDIC SURGERY | Facility: CLINIC | Age: 68
End: 2023-09-21
Payer: COMMERCIAL

## 2023-09-21 VITALS — BODY MASS INDEX: 29.03 KG/M2 | WEIGHT: 185 LBS | HEIGHT: 67 IN

## 2023-09-21 PROCEDURE — 99213 OFFICE O/P EST LOW 20 MIN: CPT

## 2023-09-22 ENCOUNTER — APPOINTMENT (OUTPATIENT)
Dept: ORTHOPEDIC SURGERY | Facility: CLINIC | Age: 68
End: 2023-09-22
Payer: COMMERCIAL

## 2023-09-22 VITALS — HEIGHT: 67 IN | BODY MASS INDEX: 29.03 KG/M2 | WEIGHT: 185 LBS

## 2023-09-22 PROCEDURE — 99213 OFFICE O/P EST LOW 20 MIN: CPT

## 2023-09-22 PROCEDURE — 72100 X-RAY EXAM L-S SPINE 2/3 VWS: CPT

## 2023-09-22 RX ORDER — METHYLPREDNISOLONE 4 MG/1
4 TABLET ORAL
Qty: 1 | Refills: 0 | Status: ACTIVE | COMMUNITY
Start: 2023-09-22 | End: 1900-01-01

## 2023-09-29 ENCOUNTER — APPOINTMENT (OUTPATIENT)
Dept: ORTHOPEDIC SURGERY | Facility: CLINIC | Age: 68
End: 2023-09-29
Payer: COMMERCIAL

## 2023-09-29 VITALS — WEIGHT: 185 LBS | HEIGHT: 67 IN | BODY MASS INDEX: 29.03 KG/M2

## 2023-09-29 PROCEDURE — 99213 OFFICE O/P EST LOW 20 MIN: CPT

## 2023-09-29 RX ORDER — GABAPENTIN 100 MG/1
100 CAPSULE ORAL
Qty: 60 | Refills: 0 | Status: ACTIVE | COMMUNITY
Start: 2023-09-29 | End: 1900-01-01

## 2023-10-09 ENCOUNTER — APPOINTMENT (OUTPATIENT)
Dept: MRI IMAGING | Facility: CLINIC | Age: 68
End: 2023-10-09
Payer: COMMERCIAL

## 2023-10-09 PROCEDURE — 72148 MRI LUMBAR SPINE W/O DYE: CPT

## 2023-10-11 ENCOUNTER — APPOINTMENT (OUTPATIENT)
Dept: ORTHOPEDIC SURGERY | Facility: CLINIC | Age: 68
End: 2023-10-11
Payer: COMMERCIAL

## 2023-10-11 VITALS — BODY MASS INDEX: 29.03 KG/M2 | WEIGHT: 185 LBS | HEIGHT: 67 IN

## 2023-10-11 PROCEDURE — 99214 OFFICE O/P EST MOD 30 MIN: CPT

## 2023-10-11 RX ORDER — TRAMADOL HYDROCHLORIDE 50 MG/1
50 TABLET, COATED ORAL
Qty: 28 | Refills: 0 | Status: ACTIVE | COMMUNITY
Start: 2023-10-11 | End: 1900-01-01

## 2023-10-11 RX ORDER — TIZANIDINE 4 MG/1
4 TABLET ORAL EVERY 8 HOURS
Qty: 30 | Refills: 0 | Status: ACTIVE | COMMUNITY
Start: 2023-10-11 | End: 1900-01-01

## 2023-10-13 ENCOUNTER — APPOINTMENT (OUTPATIENT)
Dept: ORTHOPEDIC SURGERY | Facility: CLINIC | Age: 68
End: 2023-10-13

## 2023-10-13 RX ORDER — OXYCODONE 5 MG/1
5 TABLET ORAL
Qty: 28 | Refills: 0 | Status: ACTIVE | COMMUNITY
Start: 2023-10-13 | End: 1900-01-01

## 2023-10-18 ENCOUNTER — APPOINTMENT (OUTPATIENT)
Dept: PAIN MANAGEMENT | Facility: CLINIC | Age: 68
End: 2023-10-18
Payer: COMMERCIAL

## 2023-10-18 VITALS — HEIGHT: 67 IN | WEIGHT: 185 LBS | BODY MASS INDEX: 29.03 KG/M2

## 2023-10-18 PROCEDURE — 99214 OFFICE O/P EST MOD 30 MIN: CPT

## 2023-10-27 ENCOUNTER — APPOINTMENT (OUTPATIENT)
Dept: PAIN MANAGEMENT | Facility: CLINIC | Age: 68
End: 2023-10-27
Payer: COMMERCIAL

## 2023-10-27 PROCEDURE — 64484 NJX AA&/STRD TFRM EPI L/S EA: CPT | Mod: 59,LT

## 2023-10-27 PROCEDURE — 64483 NJX AA&/STRD TFRM EPI L/S 1: CPT | Mod: LT

## 2023-12-04 ENCOUNTER — APPOINTMENT (OUTPATIENT)
Dept: PAIN MANAGEMENT | Facility: CLINIC | Age: 68
End: 2023-12-04
Payer: MEDICARE

## 2023-12-04 VITALS — WEIGHT: 185 LBS | BODY MASS INDEX: 29.03 KG/M2 | HEIGHT: 67 IN

## 2023-12-04 DIAGNOSIS — M54.17 RADICULOPATHY, LUMBOSACRAL REGION: ICD-10-CM

## 2023-12-04 PROCEDURE — 99213 OFFICE O/P EST LOW 20 MIN: CPT

## 2023-12-06 ENCOUNTER — APPOINTMENT (OUTPATIENT)
Dept: ORTHOPEDIC SURGERY | Facility: CLINIC | Age: 68
End: 2023-12-06
Payer: MEDICARE

## 2023-12-06 PROCEDURE — 99212 OFFICE O/P EST SF 10 MIN: CPT

## 2023-12-23 ENCOUNTER — NON-APPOINTMENT (OUTPATIENT)
Age: 68
End: 2023-12-23

## 2023-12-24 ENCOUNTER — APPOINTMENT (OUTPATIENT)
Dept: ORTHOPEDIC SURGERY | Facility: CLINIC | Age: 68
End: 2023-12-24
Payer: MEDICARE

## 2023-12-24 VITALS — WEIGHT: 185 LBS | HEIGHT: 67 IN | BODY MASS INDEX: 29.03 KG/M2

## 2023-12-24 DIAGNOSIS — M51.16 INTERVERTEBRAL DISC DISORDERS WITH RADICULOPATHY, LUMBAR REGION: ICD-10-CM

## 2023-12-24 PROCEDURE — 99214 OFFICE O/P EST MOD 30 MIN: CPT | Mod: 25

## 2023-12-24 PROCEDURE — 20552 NJX 1/MLT TRIGGER POINT 1/2: CPT | Mod: LT

## 2023-12-24 PROCEDURE — 72100 X-RAY EXAM L-S SPINE 2/3 VWS: CPT

## 2023-12-24 PROCEDURE — 72170 X-RAY EXAM OF PELVIS: CPT

## 2023-12-24 RX ORDER — METHYLPREDNISOLONE 4 MG/1
4 TABLET ORAL
Qty: 1 | Refills: 0 | Status: ACTIVE | COMMUNITY
Start: 2023-12-24 | End: 1900-01-01

## 2023-12-24 RX ORDER — CYCLOBENZAPRINE HYDROCHLORIDE 5 MG/1
5 TABLET, FILM COATED ORAL
Qty: 30 | Refills: 0 | Status: ACTIVE | COMMUNITY
Start: 2023-12-24 | End: 1900-01-01

## 2023-12-24 NOTE — ASSESSMENT
[FreeTextEntry1] : recurrent lower back pain since 12/22/23.  no specific injury but possible exacerbation due to working out/ exercise.  history of lumbar HNP - treated by any in past with cj with success (this was done in the fall 2023).  ttp left lumbar muscles today.  occ left leg radicular symptoms.   likely hnp flare up with muscle spasms.

## 2023-12-24 NOTE — DISCUSSION/SUMMARY
[de-identified] : tpi left lumbar spasms for pain relief.  pt. mdp and flexeril already at pharmacy - advised no nsaids while on pack. follow up with any when he is back from an out of town wedding - discussed possibility of 2nd lesi with any if symptoms persist.

## 2023-12-24 NOTE — PROCEDURE
[FreeTextEntry3] : Procedure Name: Trigger Point Injection: Celestone and Lidocaine  Trigger Point Injection was performed because of pain and inflammation. Anesthesia: ethyl chloride sprayed topically..  Celestone: An injection of Celestone 6 mg , 1 cc.  Lidocaine: 2 cc.   Patient has tried OTC's including aspirin, Ibuprofen, Aleve etc or prescription NSAIDS, and/or exercises at home and/ or physical therapy without satisfactory response and Patient has decreased mobility in the joint. The risks, benefits, and alternatives to cortisone injection were explained in full to the patient. Risks outlined include but are not limited to infection, sepsis, bleeding, scarring, skin discoloration, temporary increase in pain, syncopal episode, failure to resolve symptoms, allergic reaction, symptom recurrence, and elevation of blood sugar in diabetics. Patient understood the risks. All questions were answered.   Oral informed consent was obtained.   Sterile technique was utilized for the procedure including the preparation of the solutions used for the injection. Medication was injected into the LEFT LUMBAR PARASPINAL(S).   Patient tolerated the procedure well. Advised to ice the injection site this evening.  Post Procedure Instructions: Patient was advised to call if redness, pain, or fever occur and apply ice for 15 min. out of every hour for the next 12-24 hours as tolerated. patient was advised to rest the joint(s) for 2 days.

## 2023-12-24 NOTE — HISTORY OF PRESENT ILLNESS
[Lower back] : lower back [10] : 10 [Radiating] : radiating [Sharp] : sharp [Constant] : constant [Injection therapy] : injection therapy [de-identified] : 12/24/23: pt is a 69 y/o male with lower back pain. pt had a telehealth appt with DOROTHY Deal earlier today. Patient called regarding low back pain. pt states pain is radiating down his left leg. States pain has returned since the LESI 6 weeks prior. Has severe pain, pain radiating down the leg. No saddle anesthesia or BB incontinence. No new weakness. Sent MDP and flexeril to pharmacy, told patient they can go to office for TPI. Patient denied hx of diabetes patient to follow up with Dr. Estes after holidays.   [] : no [FreeTextEntry7] : into left leg [de-identified] : [place] [de-identified] : motion  [de-identified] : MRI, X-ray  [de-identified] : inj therapy

## 2023-12-24 NOTE — PHYSICAL EXAM
[] : light touch intact throughout both lower extremities [Disc space narrowing] : Disc space narrowing [There are no fractures, subluxations or dislocations. No significant abnormalities are seen] : There are no fractures, subluxations or dislocations. No significant abnormalities are seen

## 2024-01-10 ENCOUNTER — APPOINTMENT (OUTPATIENT)
Dept: ORTHOPEDIC SURGERY | Facility: CLINIC | Age: 69
End: 2024-01-10
Payer: COMMERCIAL

## 2024-01-10 PROCEDURE — 99215 OFFICE O/P EST HI 40 MIN: CPT

## 2024-01-10 NOTE — ASSESSMENT
[FreeTextEntry1] : 68M with low back pain and LLE radic.   L3-4 HNP with extruded fragment  Pain gone after 1 HIREN but recurred,    Long discussion with patient regarding options including repeat HIREN vs surgical intervention.  At htis time patient would like a more durable solution given severity of recurrence.    DIcsused L3-4 Microdisc.  Risks and benefits of surgery including but not limited to bleeding, infection, damage to surrounding structures, need for revision surgery, durotomy, CSF leak, anesthetic complications, persistent symptoms,. persistent back pain, persistent leg symptoms  were discussed   We will begin scheduling process for Left L3-4 microdiscectomy

## 2024-01-10 NOTE — HISTORY OF PRESENT ILLNESS
[Dull/Aching] : dull/aching [Localized] : localized [Standing] : standing [Walking] : walking [Stairs] : stairs [Retired] : Work status: retired [de-identified] : 01/10/2024: states he had a painful episode last month and received prior treatment at  [place].  Got HIREN x 1 with mild relief for 6 weeks but pain has recurred.    12/06/2023: pain has improved after receiving epidural by Dr. Estes.   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 No with hx of back pain improved with LESI. Patient was taking motrin prn then transitioned to course of MDP, tizanadine, tramadol and gabapentin, with no improvement.  Patient reports Left sided low back pain with LLE radicular pain and numbness traveling laterally down leg. Worse with ambulation, improves with rest.   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

## 2024-01-17 ENCOUNTER — APPOINTMENT (OUTPATIENT)
Dept: PAIN MANAGEMENT | Facility: CLINIC | Age: 69
End: 2024-01-17

## 2024-01-18 ENCOUNTER — OUTPATIENT (OUTPATIENT)
Dept: OUTPATIENT SERVICES | Facility: HOSPITAL | Age: 69
LOS: 1 days | End: 2024-01-18
Payer: COMMERCIAL

## 2024-01-18 VITALS
DIASTOLIC BLOOD PRESSURE: 80 MMHG | OXYGEN SATURATION: 98 % | SYSTOLIC BLOOD PRESSURE: 134 MMHG | TEMPERATURE: 97 F | HEART RATE: 88 BPM | HEIGHT: 67 IN | RESPIRATION RATE: 16 BRPM | WEIGHT: 201.06 LBS

## 2024-01-18 DIAGNOSIS — I10 ESSENTIAL (PRIMARY) HYPERTENSION: ICD-10-CM

## 2024-01-18 DIAGNOSIS — M51.26 OTHER INTERVERTEBRAL DISC DISPLACEMENT, LUMBAR REGION: ICD-10-CM

## 2024-01-18 DIAGNOSIS — Z98.890 OTHER SPECIFIED POSTPROCEDURAL STATES: Chronic | ICD-10-CM

## 2024-01-18 DIAGNOSIS — Z91.89 OTHER SPECIFIED PERSONAL RISK FACTORS, NOT ELSEWHERE CLASSIFIED: ICD-10-CM

## 2024-01-18 LAB
A1C WITH ESTIMATED AVERAGE GLUCOSE RESULT: 6.1 % — HIGH (ref 4–5.6)
ALBUMIN SERPL ELPH-MCNC: 4.3 G/DL — SIGNIFICANT CHANGE UP (ref 3.3–5)
ALP SERPL-CCNC: 63 U/L — SIGNIFICANT CHANGE UP (ref 40–120)
ALT FLD-CCNC: 16 U/L — SIGNIFICANT CHANGE UP (ref 4–41)
ANION GAP SERPL CALC-SCNC: 13 MMOL/L — SIGNIFICANT CHANGE UP (ref 7–14)
AST SERPL-CCNC: 15 U/L — SIGNIFICANT CHANGE UP (ref 4–40)
BILIRUB SERPL-MCNC: 0.5 MG/DL — SIGNIFICANT CHANGE UP (ref 0.2–1.2)
BLD GP AB SCN SERPL QL: POSITIVE — SIGNIFICANT CHANGE UP
BUN SERPL-MCNC: 17 MG/DL — SIGNIFICANT CHANGE UP (ref 7–23)
CALCIUM SERPL-MCNC: 9.6 MG/DL — SIGNIFICANT CHANGE UP (ref 8.4–10.5)
CHLORIDE SERPL-SCNC: 102 MMOL/L — SIGNIFICANT CHANGE UP (ref 98–107)
CO2 SERPL-SCNC: 25 MMOL/L — SIGNIFICANT CHANGE UP (ref 22–31)
CREAT SERPL-MCNC: 0.73 MG/DL — SIGNIFICANT CHANGE UP (ref 0.5–1.3)
EGFR: 99 ML/MIN/1.73M2 — SIGNIFICANT CHANGE UP
ESTIMATED AVERAGE GLUCOSE: 128 — SIGNIFICANT CHANGE UP
GLUCOSE SERPL-MCNC: 111 MG/DL — HIGH (ref 70–99)
HCT VFR BLD CALC: 44.4 % — SIGNIFICANT CHANGE UP (ref 39–50)
HGB BLD-MCNC: 14.4 G/DL — SIGNIFICANT CHANGE UP (ref 13–17)
MCHC RBC-ENTMCNC: 28.3 PG — SIGNIFICANT CHANGE UP (ref 27–34)
MCHC RBC-ENTMCNC: 32.4 GM/DL — SIGNIFICANT CHANGE UP (ref 32–36)
MCV RBC AUTO: 87.2 FL — SIGNIFICANT CHANGE UP (ref 80–100)
MRSA PCR RESULT.: SIGNIFICANT CHANGE UP
NRBC # BLD: 0 /100 WBCS — SIGNIFICANT CHANGE UP (ref 0–0)
NRBC # FLD: 0 K/UL — SIGNIFICANT CHANGE UP (ref 0–0)
PLATELET # BLD AUTO: 255 K/UL — SIGNIFICANT CHANGE UP (ref 150–400)
POTASSIUM SERPL-MCNC: 3.7 MMOL/L — SIGNIFICANT CHANGE UP (ref 3.5–5.3)
POTASSIUM SERPL-SCNC: 3.7 MMOL/L — SIGNIFICANT CHANGE UP (ref 3.5–5.3)
PROT SERPL-MCNC: 7.4 G/DL — SIGNIFICANT CHANGE UP (ref 6–8.3)
RBC # BLD: 5.09 M/UL — SIGNIFICANT CHANGE UP (ref 4.2–5.8)
RBC # FLD: 12.6 % — SIGNIFICANT CHANGE UP (ref 10.3–14.5)
RH IG SCN BLD-IMP: POSITIVE — SIGNIFICANT CHANGE UP
RH IG SCN BLD-IMP: POSITIVE — SIGNIFICANT CHANGE UP
S AUREUS DNA NOSE QL NAA+PROBE: SIGNIFICANT CHANGE UP
SODIUM SERPL-SCNC: 140 MMOL/L — SIGNIFICANT CHANGE UP (ref 135–145)
WBC # BLD: 5.61 K/UL — SIGNIFICANT CHANGE UP (ref 3.8–10.5)
WBC # FLD AUTO: 5.61 K/UL — SIGNIFICANT CHANGE UP (ref 3.8–10.5)

## 2024-01-18 PROCEDURE — 86077 PHYS BLOOD BANK SERV XMATCH: CPT

## 2024-01-18 RX ORDER — AMLODIPINE BESYLATE 2.5 MG/1
1 TABLET ORAL
Qty: 0 | Refills: 0 | DISCHARGE

## 2024-01-18 RX ORDER — SIMVASTATIN 20 MG/1
1 TABLET, FILM COATED ORAL
Qty: 0 | Refills: 0 | DISCHARGE

## 2024-01-18 RX ORDER — SODIUM CHLORIDE 9 MG/ML
1000 INJECTION, SOLUTION INTRAVENOUS
Refills: 0 | Status: DISCONTINUED | OUTPATIENT
Start: 2024-01-22 | End: 2024-01-22

## 2024-01-18 RX ORDER — LOSARTAN POTASSIUM 100 MG/1
1 TABLET, FILM COATED ORAL
Qty: 0 | Refills: 0 | DISCHARGE

## 2024-01-18 NOTE — H&P PST ADULT - MUSCULOSKELETAL COMMENTS
Pre op dx- Herniated nucleus pulposus, L3-4 left Pt c/o back pain since last few months. MRI of lumbar spine completed on 10/9/23 L3-4 HNP with extruded herniation. Conservative therapy did not give any pain relief, pt desiring for surgical intervention.

## 2024-01-18 NOTE — H&P PST ADULT - NSICDXPASTMEDICALHX_GEN_ALL_CORE_FT
PAST MEDICAL HISTORY:  BPH without urinary obstruction     Herniated nucleus pulposus, L3-4 left     HTN (hypertension)     Hyperlipidemia

## 2024-01-18 NOTE — H&P PST ADULT - PROBLEM SELECTOR PLAN 1
Patient tentatively scheduled for left L3-4 microdiscectomy on 01/22/2024  Pre-op instructions provided. Pt given verbal and written instructions with teach back on chlorhexidine wash x 3 days  and pepcid. Pt verbalized understanding with return demonstration.  Labs done.  Requesting medical evaluation as per surgeon's request. Patient tentatively scheduled for left L3-4 microdiscectomy on 01/22/2024  Pre-op instructions provided. Pt given verbal and written instructions with teach back on chlorhexidine wash x 3 days  and pepcid. Pt verbalized understanding with return demonstration.  Labs done.  Requesting medical evaluation as per surgeon's request.  Requesting echo , stress reports and current EKG from PCP.

## 2024-01-18 NOTE — H&P PST ADULT - ATTENDING COMMENTS
Patient seen and examined in preoperative holding area. Having persistent back pina and pain radiating to left buttock lateral thigh at this time.   Has failed PT, NSAIDS, 1 epidural without persistent adequate relief.  MRI with Left L3-4 herniated disc with extruded fragment.  Risks and benefits of surgery discussed with patient and wife in the office as well as in preoperative holding area. Risks including but not limited to bleeding, infection, damage to surrounding structures, persistent leg pain, persistent back pain, durotomy,  csf leak, need for revision procedure, iatrogenic instability , leg weakness, and numbness were discussed.  After this discussion plan was made to move ahead with Left L3-4 hemilaminotomy, discectomy.

## 2024-01-18 NOTE — H&P PST ADULT - HISTORY OF PRESENT ILLNESS
68 year old male with hx of HTN, BPH , HLD, presents to PST  with pre op dx of herniated nucleus pulposus, L3-4 left is scheduled for left L3-4 microdiscectomy.

## 2024-01-18 NOTE — H&P PST ADULT - MUSCULOSKELETAL
back pain/no calf tenderness/decreased ROM/decreased ROM due to pain/back exam/extremities exam details…

## 2024-01-18 NOTE — H&P PST ADULT - GENITOURINARY COMMENTS
Pt states he has hx of microscopic hematuria , had cystoscopy done in past .Pt states his urologist says its nothing to worry about  . Pt does not remember urologist name.

## 2024-01-18 NOTE — H&P PST ADULT - FUNCTIONAL STATUS
Mets Dasi score 5.07 Walks 1 to 2 blocks, climbs 1 flight of stairs, ADLs/4-10 METS Mets Dasi score 5.07 , Walks 1 to 2 blocks, climbs 1 flight of stairs, ADLs/4-10 METS

## 2024-01-18 NOTE — H&P PST ADULT - NEGATIVE RESPIRATORY AND THORAX SYMPTOMS
I personally performed the service described in the documentation recorded by the scribe in my presence, and it accurately and completely records my words and actions. no wheezing/no dyspnea/no cough

## 2024-01-18 NOTE — H&P PST ADULT - NSICDXPASTSURGICALHX_GEN_ALL_CORE_FT
PAST SURGICAL HISTORY:  H/O colonoscopy     H/O cystoscopy     S/P hernia surgery     S/P wrist surgery left side with screws

## 2024-01-19 PROBLEM — M51.26 OTHER INTERVERTEBRAL DISC DISPLACEMENT, LUMBAR REGION: Chronic | Status: ACTIVE | Noted: 2024-01-18

## 2024-01-19 NOTE — ASU PATIENT PROFILE, ADULT - FALL HARM RISK - HARM RISK INTERVENTIONS

## 2024-01-21 ENCOUNTER — TRANSCRIPTION ENCOUNTER (OUTPATIENT)
Age: 69
End: 2024-01-21

## 2024-01-21 NOTE — ASU DISCHARGE PLAN (ADULT/PEDIATRIC) - CARE PROVIDER_API CALL
Oracio Chadwick  Orthopaedic Surgery  [address]  [place] [address]  Phone: [phone]  Fax: [phone])-  Follow Up Time: 2 weeks

## 2024-01-21 NOTE — ASU DISCHARGE PLAN (ADULT/PEDIATRIC) - NURSING INSTRUCTIONS
DO NOT take any Tylenol (Acetaminophen) or narcotics containing Tylenol until after  _2:45pm_____ . You received Tylenol during your operation and it can cause damage to your liver if too much is taken within a 24 hour time period. keep incision site clean and dry

## 2024-01-21 NOTE — ASU DISCHARGE PLAN (ADULT/PEDIATRIC) - ASU DC SPECIAL INSTRUCTIONSFT
Palliative Care: Follow up call to daughter for emotional support. LM as of 1500 to daughter to clairfy ACP/DPOA role. Nurse Abdiaziz Thompson does state daughter agreed to call patient at this time to open up dialogue and be supportive in patient overall care. Patient remains full code. Palliative will continue to follow. WOUND CARE: Do not remove dressing until follow up. Keep dressing/incision clean, dry, and intact.    BATHING: You may sponge bathe and/or shower beginning 3 days following surgery. Your dressing is water-resistant but NOT waterproof. Please do not submerge wound underwater. Do not scrub incisions or apply lotions/creams at the surgical site.    ACTIVITY: Your weight-bearing status is weight-bearing as tolerated. If you are taking narcotic pain medication (such as oxycodone), do NOT drive a car, operate machinery, or make important decisions.    DIET: Return to your usual diet.    NOTIFY YOUR SURGEON IF: You have any bleeding that does not stop, any pus draining from your wound, any fever (over 100.4 F) or chills, persistent nausea/vomiting, persistent diarrhea, or if your pain is not controlled on your discharge pain medications.    PAIN CONTROL: Please take medication as prescribed. If you have been prescribed a narcotic (such as oxycodone), please be aware that narcotic pain medicine can cause extreme nausea and constipation. Drink plenty of water and take stool softeners/laxatives (e.g., Colace, Miralax) as needed. You can get them from your local pharmacy. If you have been prescribed a narcotic (such as oxycodone), please take for severe pain only. You can take up to eight Tylenol 325 mg per day while taking oxycodone. You can also place ice on the affected area for 20 minutes on then 20 minutes off, repeating the cycle as needed while awake.    FOLLOW-UP:  Follow-up with Dr. Chadwick in 2 weeks following discharge. Please call office for appointment if you do not already have one. WOUND CARE: Do not remove dressing until follow up. Keep dressing/incision clean, dry, and intact.    BATHING: You may sponge bathe and/or shower beginning 3 days following surgery. Your dressing is water-resistant but NOT waterproof. Please do not submerge wound underwater. Do not scrub incisions or apply lotions/creams at the surgical site.    ACTIVITY: Your weight-bearing status is weight-bearing as tolerated. If you are taking narcotic pain medication (such as oxycodone), do NOT drive a car, operate machinery, or make important decisions.    DIET: Return to your usual diet.    NOTIFY YOUR SURGEON IF: You have any bleeding that does not stop, any pus draining from your wound, any fever (over 100.4 F) or chills, persistent nausea/vomiting, persistent diarrhea, or if your pain is not controlled on your discharge pain medications.    PAIN CONTROL: Please take medication as prescribed. If you have been prescribed a narcotic (such as oxycodone), please be aware that narcotic pain medicine can cause extreme nausea and constipation. Drink plenty of water and take stool softeners/laxatives (e.g., Colace, Miralax) as needed. You can get them from your local pharmacy. If you have been prescribed a narcotic (such as oxycodone), please take for severe pain only. You can take up to eight Tylenol 325 mg per day while taking oxycodone. Alternate between taking over the counter Ibuprofen and Tylenol so you are taking pain medication every 3-4 hours if your pain is severe. You can also place ice on the affected area for 20 minutes on then 20 minutes off, repeating the cycle as needed while awake.    FOLLOW-UP:  Follow-up with Dr. Chadwick in 2 weeks following discharge. Please call office for appointment if you do not already have one.

## 2024-01-21 NOTE — ASU DISCHARGE PLAN (ADULT/PEDIATRIC) - MEDICATION INSTRUCTIONS
Alternate Tylenol 650MG (2 REGULAR STRENGTH) AND Motrin 400MG (2 REGULAR STRENGTH) EVERY 3 hours making sure that each dose of Tylenol is 6 hours from previous tylenol dose and each dose of motrin is 6 hours from previous motrin dose.  The  first dose of MOTRIN/IBUPROFEN can be no earlier than 4pm and the first dose of TYLENOL/ACETAMINOPHEN INCLUDING PERCOCET/VICODIN AND COLD MEDICINE can be no earlier than 3pm. The maximum amount of daily Tylenol is 4000MG. Please keep that in mind.

## 2024-01-21 NOTE — ASU DISCHARGE PLAN (ADULT/PEDIATRIC) - CALL YOUR DOCTOR IF YOU HAVE ANY OF THE FOLLOWING:
Bleeding that does not stop/Pain not relieved by Medications/Fever greater than (need to indicate Fahrenheit or Celsius)/Wound/Surgical Site with redness, or foul smelling discharge or pus/Nausea and vomiting that does not stop/Excessive diarrhea

## 2024-01-22 ENCOUNTER — INPATIENT (INPATIENT)
Facility: HOSPITAL | Age: 69
LOS: 0 days | Discharge: HOME | End: 2024-01-22
Attending: STUDENT IN AN ORGANIZED HEALTH CARE EDUCATION/TRAINING PROGRAM | Admitting: STUDENT IN AN ORGANIZED HEALTH CARE EDUCATION/TRAINING PROGRAM
Payer: COMMERCIAL

## 2024-01-22 ENCOUNTER — APPOINTMENT (OUTPATIENT)
Dept: ORTHOPEDIC SURGERY | Facility: HOSPITAL | Age: 69
End: 2024-01-22
Payer: COMMERCIAL

## 2024-01-22 ENCOUNTER — RESULT REVIEW (OUTPATIENT)
Age: 69
End: 2024-01-22

## 2024-01-22 VITALS
SYSTOLIC BLOOD PRESSURE: 114 MMHG | DIASTOLIC BLOOD PRESSURE: 81 MMHG | RESPIRATION RATE: 18 BRPM | OXYGEN SATURATION: 98 % | HEART RATE: 89 BPM

## 2024-01-22 VITALS
WEIGHT: 201.06 LBS | SYSTOLIC BLOOD PRESSURE: 126 MMHG | HEART RATE: 96 BPM | HEIGHT: 67 IN | RESPIRATION RATE: 16 BRPM | OXYGEN SATURATION: 99 % | DIASTOLIC BLOOD PRESSURE: 79 MMHG | TEMPERATURE: 98 F

## 2024-01-22 DIAGNOSIS — Z98.890 OTHER SPECIFIED POSTPROCEDURAL STATES: Chronic | ICD-10-CM

## 2024-01-22 DIAGNOSIS — M51.26 OTHER INTERVERTEBRAL DISC DISPLACEMENT, LUMBAR REGION: ICD-10-CM

## 2024-01-22 LAB — GLUCOSE BLDC GLUCOMTR-MCNC: 112 MG/DL — HIGH (ref 70–99)

## 2024-01-22 PROCEDURE — 63030 LAMOT DCMPRN NRV RT 1 LMBR: CPT | Mod: LT

## 2024-01-22 PROCEDURE — 88304 TISSUE EXAM BY PATHOLOGIST: CPT | Mod: 26

## 2024-01-22 DEVICE — SURGIFLO MATRIX WITH THROMBIN KIT: Type: IMPLANTABLE DEVICE | Site: LEFT | Status: FUNCTIONAL

## 2024-01-22 DEVICE — BONE WAX 2.5GM: Type: IMPLANTABLE DEVICE | Site: LEFT | Status: FUNCTIONAL

## 2024-01-22 DEVICE — SURGIFOAM PAD 8CM X 12.5CM X 2MM (100C): Type: IMPLANTABLE DEVICE | Site: LEFT | Status: FUNCTIONAL

## 2024-01-22 RX ORDER — SIMVASTATIN 20 MG/1
1 TABLET, FILM COATED ORAL
Refills: 0 | DISCHARGE

## 2024-01-22 RX ORDER — ONDANSETRON 8 MG/1
4 TABLET, FILM COATED ORAL ONCE
Refills: 0 | Status: DISCONTINUED | OUTPATIENT
Start: 2024-01-22 | End: 2024-01-22

## 2024-01-22 RX ORDER — AMLODIPINE BESYLATE 2.5 MG/1
1 TABLET ORAL
Refills: 0 | DISCHARGE

## 2024-01-22 RX ORDER — NALOXONE HYDROCHLORIDE 4 MG/.1ML
4 SPRAY NASAL
Qty: 0.5 | Refills: 0
Start: 2024-01-22

## 2024-01-22 RX ORDER — OXYCODONE HYDROCHLORIDE 5 MG/1
5 TABLET ORAL ONCE
Refills: 0 | Status: DISCONTINUED | OUTPATIENT
Start: 2024-01-22 | End: 2024-01-22

## 2024-01-22 RX ORDER — FENTANYL CITRATE 50 UG/ML
25 INJECTION INTRAVENOUS
Refills: 0 | Status: DISCONTINUED | OUTPATIENT
Start: 2024-01-22 | End: 2024-01-22

## 2024-01-22 RX ORDER — TAMSULOSIN HYDROCHLORIDE 0.4 MG/1
1 CAPSULE ORAL
Refills: 0 | DISCHARGE

## 2024-01-22 RX ORDER — OXYCODONE HYDROCHLORIDE 5 MG/1
10 TABLET ORAL ONCE
Refills: 0 | Status: DISCONTINUED | OUTPATIENT
Start: 2024-01-22 | End: 2024-01-22

## 2024-01-22 RX ORDER — OXYCODONE HYDROCHLORIDE 5 MG/1
1 TABLET ORAL
Qty: 20 | Refills: 0
Start: 2024-01-22

## 2024-01-22 RX ORDER — PANTOPRAZOLE SODIUM 20 MG/1
40 TABLET, DELAYED RELEASE ORAL ONCE
Refills: 0 | Status: COMPLETED | OUTPATIENT
Start: 2024-01-22 | End: 2024-01-22

## 2024-01-22 RX ORDER — LOSARTAN POTASSIUM 100 MG/1
1 TABLET, FILM COATED ORAL
Refills: 0 | DISCHARGE

## 2024-01-22 RX ORDER — FENTANYL CITRATE 50 UG/ML
50 INJECTION INTRAVENOUS ONCE
Refills: 0 | Status: DISCONTINUED | OUTPATIENT
Start: 2024-01-22 | End: 2024-01-22

## 2024-01-22 RX ORDER — POTASSIUM CITRATE MONOHYDRATE 100 %
2 POWDER (GRAM) MISCELLANEOUS
Refills: 0 | DISCHARGE

## 2024-01-22 RX ORDER — CYCLOBENZAPRINE HYDROCHLORIDE 10 MG/1
1 TABLET, FILM COATED ORAL
Qty: 21 | Refills: 0
Start: 2024-01-22 | End: 2024-01-28

## 2024-01-22 RX ADMIN — Medication 150 MILLIGRAM(S): at 07:37

## 2024-01-22 RX ADMIN — OXYCODONE HYDROCHLORIDE 5 MILLIGRAM(S): 5 TABLET ORAL at 12:30

## 2024-01-22 RX ADMIN — OXYCODONE HYDROCHLORIDE 5 MILLIGRAM(S): 5 TABLET ORAL at 12:08

## 2024-01-22 RX ADMIN — PANTOPRAZOLE SODIUM 40 MILLIGRAM(S): 20 TABLET, DELAYED RELEASE ORAL at 07:36

## 2024-01-22 NOTE — ASU PREOP CHECKLIST - SELECT TESTS ORDERED
fs=/BMP/CBC/Type and Cross/Type and Screen/EKG/POCT Blood Glucose qz=570/BMP/CBC/Type and Cross/Type and Screen/EKG/POCT Blood Glucose

## 2024-01-29 ENCOUNTER — APPOINTMENT (OUTPATIENT)
Dept: PAIN MANAGEMENT | Facility: CLINIC | Age: 69
End: 2024-01-29

## 2024-01-29 LAB — SURGICAL PATHOLOGY STUDY: SIGNIFICANT CHANGE UP

## 2024-02-07 ENCOUNTER — APPOINTMENT (OUTPATIENT)
Dept: ORTHOPEDIC SURGERY | Facility: CLINIC | Age: 69
End: 2024-02-07
Payer: MEDICARE

## 2024-02-07 PROCEDURE — 99024 POSTOP FOLLOW-UP VISIT: CPT

## 2024-02-07 NOTE — HISTORY OF PRESENT ILLNESS
[Dull/Aching] : dull/aching [Localized] : localized [Standing] : standing [Walking] : walking [Stairs] : stairs [Retired] : Work status: retired [de-identified] : 2/7/24  Patient doing well. No radicular complaints.  Mild low back pain intermittently. Taking OTC Nsaids PRN.   01/10/2024: states he had a painful episode last month and received prior treatment at  in [place]. wants surgical consult.  Got HIREN x 1 with mild relief for 6 weeks but pain has recurred.    12/06/2023: pain has improved after receiving epidural by Dr. Estes.   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 No with hx of back pain improved with LESI. Patient was taking motrin prn then transitioned to course of MDP, tizanadine, tramadol and gabapentin, with no improvement.  Patient reports Left sided low back pain with LLE radicular pain and numbness traveling laterally down leg. Worse with ambulation, improves with rest.   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

## 2024-02-07 NOTE — ASSESSMENT
[FreeTextEntry1] : 68M with low back pain and LLE radic.   L3-4 HNP with extruded fragment  s/p l3-4 MicroDisk 2 weeks ago FU 4 weeks no lifting more than 15 pounds

## 2024-03-06 ENCOUNTER — APPOINTMENT (OUTPATIENT)
Dept: ORTHOPEDIC SURGERY | Facility: CLINIC | Age: 69
End: 2024-03-06
Payer: MEDICARE

## 2024-03-06 DIAGNOSIS — M51.26 OTHER INTERVERTEBRAL DISC DISPLACEMENT, LUMBAR REGION: ICD-10-CM

## 2024-03-06 PROCEDURE — 99024 POSTOP FOLLOW-UP VISIT: CPT

## 2024-03-06 NOTE — HISTORY OF PRESENT ILLNESS
[Dull/Aching] : dull/aching [Localized] : localized [Standing] : standing [Walking] : walking [Stairs] : stairs [Retired] : Work status: retired [de-identified] : 3/6/24 Doing well.  Increased activity walking over a mile a day. Has been doing inclined treadmill for 45 minutes.  Has mild back soreness at times.   2/7/24  Patient doing well. No radicular complaints.  Mild low back pain intermittently. Taking OTC Nsaids PRN.   01/10/2024: states he had a painful episode last month and received prior treatment at  in [place]. wants surgical consult.  Got HIREN x 1 with mild relief for 6 weeks but pain has recurred.    12/06/2023: pain has improved after receiving epidural by Dr. Estes.   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 No with hx of back pain improved with LESI. Patient was taking motrin prn then transitioned to course of MDP, tizanadine, tramadol and gabapentin, with no improvement.  Patient reports Left sided low back pain with LLE radicular pain and numbness traveling laterally down leg. Worse with ambulation, improves with rest.   MRI of lumbar spine completed on 10/9/23 L3-4 HNP with extruded herniation   PMH: HTN, HYPERLIPIDEMIA, BPH Allergies: NKDA Occupation: Retired  [] : Post Surgical Visit: no [FreeTextEntry1] : left hip

## 2024-03-06 NOTE — ASSESSMENT
[FreeTextEntry1] : 68M with low back pain and LLE radic.   L3-4 HNP with extruded fragment  s/p l3-4 MicroDisk 6 weeks ago FU 6 weeks begin PT increased activity as tolerated.  No paddle ball yet will discuss clearance for all acttivity at next visit.,

## 2024-03-07 ENCOUNTER — APPOINTMENT (OUTPATIENT)
Dept: ORTHOPEDIC SURGERY | Facility: CLINIC | Age: 69
End: 2024-03-07
Payer: MEDICARE

## 2024-03-07 VITALS — BODY MASS INDEX: 29.03 KG/M2 | HEIGHT: 67 IN | WEIGHT: 185 LBS

## 2024-03-07 DIAGNOSIS — Z98.890 OTHER SPECIFIED POSTPROCEDURAL STATES: ICD-10-CM

## 2024-03-07 DIAGNOSIS — M75.121 COMPLETE ROTATOR CUFF TEAR OR RUPTURE OF RIGHT SHOULDER, NOT SPECIFIED AS TRAUMATIC: ICD-10-CM

## 2024-03-07 PROCEDURE — 99213 OFFICE O/P EST LOW 20 MIN: CPT | Mod: 24

## 2024-03-07 NOTE — PHYSICAL EXAM
[Right] : right shoulder [5 ___] : forward flexion 5[unfilled]/5 [] : strength is improving [5___] : internal rotation 5[unfilled]/5 [FreeTextEntry9] :  Er 50

## 2024-03-07 NOTE — HISTORY OF PRESENT ILLNESS
[de-identified] : DOS 3/9/23: R SA, SAD, acromioplasty, synovectomy, GHD, RCR, biceps tenotomy.  3/7/24: Here for 1 yr follow up. He reports feeling great. He has no pain or weakness.  9/21/23: Here for follow up.  He has finished PT.  He feels improved.    6/13/23: Here for 3 month follow up.  He is in PT with improvement.    5/9/23: Here for 2 month follow up. He is in PT and doing great.  3/22/23: Here for first PO visit. He has been in sling. There is some bruising down his arm.  12/28/22: Here to review MRI.  MRI R shoulder: 1. AC joint arthrosis with no separation. Lateral acromial spur with narrowing of the humeroacromial interval which can be seen with impingement. Small 6-mm cyst at head of the acromion. 2. Infraspinatus tendinopathy and fraying with moderate-grade linear articular interstitial tears at the insertion. 3. Supraspinatus 12 x 9 mm full-thickness insertional tear with more extensive articular fraying. Fatty infiltration of the muscle. No atrophy. 4. Tear at superior labrum and anterior inferior labrum. Biceps tendinopathy, medial subluxation, tenosynovitis, and degenerative tear at bicipital groove through anchor. 5. Capsular thickening which can be seen with adhesive capsulitis.  12/13/22: Here for follow up.  He reports continued pain despite ibuprofen, conservative treatment.    10/25/22: 66 yo RHD male with right shoulder pain since 10/16/22. He states he tripped while getting off a boat and fell onto the dock. He states his shoulder dislocated and reduced when someone tried to lift him off the floor. He has pain at night. There is discomfort with raising his arm overhead. He ice and took ibuprofen 800mg.

## 2024-03-07 NOTE — ASSESSMENT
[FreeTextEntry1] : R shoulder pain.  MRI reviewed: RCT, some signs of degeneration, LHB longitudinal tears, labral tearing Now S/P R SA, SAD, acromioplasty, synovectomy, GHD, RCR, biceps tenotomy. Doing well. Activities as tolerated. RTO prn.

## 2024-04-08 ENCOUNTER — APPOINTMENT (OUTPATIENT)
Dept: ORTHOPEDIC SURGERY | Facility: CLINIC | Age: 69
End: 2024-04-08
Payer: MEDICARE

## 2024-04-08 VITALS — HEIGHT: 67 IN | BODY MASS INDEX: 29.03 KG/M2 | WEIGHT: 185 LBS

## 2024-04-08 PROCEDURE — J3490M: CUSTOM | Mod: NC

## 2024-04-08 PROCEDURE — 73564 X-RAY EXAM KNEE 4 OR MORE: CPT | Mod: RT

## 2024-04-08 PROCEDURE — 99213 OFFICE O/P EST LOW 20 MIN: CPT | Mod: 24,25

## 2024-04-08 PROCEDURE — 20611 DRAIN/INJ JOINT/BURSA W/US: CPT | Mod: 79,RT

## 2024-04-08 NOTE — HISTORY OF PRESENT ILLNESS
[de-identified] : 4/8/24: 67yo M here for right knee pain that began 3 days ago after inclined walking on a treadmill for 30 mins. Pain is constant and anterior. Denies buckling/locking/giving out. No formal treatment so far. Denies prior Hx.

## 2024-04-08 NOTE — PROCEDURE
[FreeTextEntry3] : - Large joint injection was performed of the RIGHT knee.  - The indication for this procedure was pain and inflammation. Patient has tried OTC's including aspirin, Ibuprofen, Aleve, etc or prescription NSAIDS, and/or exercises at home and/or physical therapy without satisfactory response, patient had decreased mobility in the joint and the risks benefits, and alternatives have been discussed, and verbal consent was obtained. The site was prepped with alcohol, betadine, ethyl chloride sprayed topically and sterile technique used.  - An injection of Betamethasone 2cc; Marcaine 5cc injected. - Patient was advised to call if redness, pain or fever occur, apply ice for 15 minutes out of every hour for the next 12-24 hours as tolerated and patient was advised to rest the joint(s) for 2 days.  - Ultrasound guidance was indicated for this patient due to prior failure or difficult injection. All ultrasound images have been permanently captured and stored accordingly in our picture archiving and communication system. Visualization of the needle and placement of injection was performed without complication.

## 2024-04-08 NOTE — ASSESSMENT
[FreeTextEntry1] : ACUTE EXACERBATION OF RIGHT KNEE OA WITH EFFUSION ON EXAM TODAY  NO MECHANICAL SX  SOME MEDIALY SIDED TTP, NO GABRIELA  CSI DISCUSSED AND DONE TODAY - TOLERATED WELL  ASP 25CC PRIOR TO INJECITON - NSF  CRYOTHERAPY, NSAIDS, ACTIVITY MODIFICATION  RTC 2 WEEKS, CONSIDER STARTING VISCO INJECTIONS

## 2024-04-08 NOTE — IMAGING
[Right] : right knee [All Views] : anteroposterior, lateral, skyline, and anteroposterior standing [Mild tricompartmental OA medial narrowing] : Mild tricompartmental OA medial narrowing [Mild tricompartmental OA lateral narrowing] : Mild tricompartmental OA lateral narrowing [Moderate patellofemoral OA] : Moderate patellofemoral OA [de-identified] : Mild effusion, no warmth, no ecchymosis Medial joint line tenderness to palpation  Range of motion 0-130 with mild anterior crepitus 5/5 quadriceps and hamstring strength Ligamentously stable Motor and sensory intact distally Non antalgic gait Negative Caitlin

## 2024-04-17 ENCOUNTER — APPOINTMENT (OUTPATIENT)
Dept: ORTHOPEDIC SURGERY | Facility: CLINIC | Age: 69
End: 2024-04-17
Payer: MEDICARE

## 2024-04-17 VITALS — BODY MASS INDEX: 29.03 KG/M2 | WEIGHT: 185 LBS | HEIGHT: 67 IN

## 2024-04-17 PROCEDURE — 99024 POSTOP FOLLOW-UP VISIT: CPT

## 2024-04-17 PROCEDURE — 99213 OFFICE O/P EST LOW 20 MIN: CPT | Mod: 24

## 2024-04-17 NOTE — HISTORY OF PRESENT ILLNESS
[Dull/Aching] : dull/aching [Localized] : localized [Standing] : standing [Walking] : walking [Stairs] : stairs [Retired] : Work status: retired [de-identified] : 04/17/2024: here for fu, reports pain continues to improve and has been able to increase activity with occasional low back pain which resolved with motrin. Denies radicular complaints. Denies wound drainage, fever, chills.   3/6/24 Doing well.  Increased activity walking over a mile a day. Has been doing inclined treadmill for 45 minutes.  Has mild back soreness at times.   2/7/24  Patient doing well. No radicular complaints.  Mild low back pain intermittently. Taking OTC Nsaids PRN.   01/10/2024: states he had a painful episode last month and received prior treatment at  in [place]. wants surgical consult.  Got HIREN x 1 with mild relief for 6 weeks but pain has recurred.    12/06/2023: pain has improved after receiving epidural by Dr. Estes.   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 No with hx of back pain improved with LESI. Patient was taking motrin prn then transitioned to course of MDP, tizanadine, tramadol and gabapentin, with no improvement.  Patient reports Left sided low back pain with LLE radicular pain and numbness traveling laterally down leg. Worse with ambulation, improves with rest.   MRI of lumbar spine completed on 10/9/23 L3-4 HNP with extruded herniation   PMH: HTN, HYPERLIPIDEMIA, BPH Allergies: NKDA Occupation: Retired  [] : Post Surgical Visit: no [FreeTextEntry1] : left hip

## 2024-04-17 NOTE — ASSESSMENT
[FreeTextEntry1] : 68M with low back pain and LLE radic.   L3-4 HNP with extruded fragment . Doing well.  No restrictions at this time.,  s/p l3-4 MicroDisk 12 weeks ago FU 3 months  increased activity as tolerated.

## 2024-04-19 ENCOUNTER — APPOINTMENT (OUTPATIENT)
Dept: ORTHOPEDIC SURGERY | Facility: CLINIC | Age: 69
End: 2024-04-19
Payer: MEDICARE

## 2024-04-19 ENCOUNTER — APPOINTMENT (OUTPATIENT)
Dept: MRI IMAGING | Facility: CLINIC | Age: 69
End: 2024-04-19
Payer: MEDICARE

## 2024-04-19 VITALS — WEIGHT: 185 LBS | HEIGHT: 67 IN | BODY MASS INDEX: 29.03 KG/M2

## 2024-04-19 DIAGNOSIS — M25.461 EFFUSION, RIGHT KNEE: ICD-10-CM

## 2024-04-19 PROCEDURE — 99213 OFFICE O/P EST LOW 20 MIN: CPT | Mod: 24

## 2024-04-19 PROCEDURE — 73721 MRI JNT OF LWR EXTRE W/O DYE: CPT | Mod: RT

## 2024-04-25 ENCOUNTER — NON-APPOINTMENT (OUTPATIENT)
Age: 69
End: 2024-04-25

## 2024-04-25 DIAGNOSIS — M79.674 PAIN IN RIGHT TOE(S): ICD-10-CM

## 2024-04-25 DIAGNOSIS — B35.1 TINEA UNGUIUM: ICD-10-CM

## 2024-04-25 DIAGNOSIS — M72.2 PLANTAR FASCIAL FIBROMATOSIS: ICD-10-CM

## 2024-04-25 DIAGNOSIS — M79.675 PAIN IN RIGHT TOE(S): ICD-10-CM

## 2024-04-26 ENCOUNTER — APPOINTMENT (OUTPATIENT)
Dept: ORTHOPEDIC SURGERY | Facility: CLINIC | Age: 69
End: 2024-04-26
Payer: MEDICARE

## 2024-04-26 VITALS — BODY MASS INDEX: 29.03 KG/M2 | HEIGHT: 67 IN | WEIGHT: 185 LBS

## 2024-04-26 DIAGNOSIS — M54.16 RADICULOPATHY, LUMBAR REGION: ICD-10-CM

## 2024-04-26 PROCEDURE — 99213 OFFICE O/P EST LOW 20 MIN: CPT

## 2024-04-26 RX ORDER — CYCLOBENZAPRINE HYDROCHLORIDE 5 MG/1
5 TABLET, FILM COATED ORAL 3 TIMES DAILY
Qty: 30 | Refills: 2 | Status: ACTIVE | COMMUNITY
Start: 2024-04-26 | End: 1900-01-01

## 2024-04-26 NOTE — ASSESSMENT
[FreeTextEntry1] : 68M with low back pain and LLE radic.   L3-4 HNP with extruded fragment . Doing well.  No restrictions at this time.,  Had flare after doing increased activity inlcuding weighted crunches and twists.  We will also prescribe Muscle Relaxants as needed.  Discussed side effects including but not limited to drowsiness and dizziness.  Discussed patient should not drive after taking the medicine.  as needed FU as previously scheduled in 3 months

## 2024-04-26 NOTE — HISTORY OF PRESENT ILLNESS
[Dull/Aching] : dull/aching [Localized] : localized [Standing] : standing [Walking] : walking [Stairs] : stairs [Retired] : Work status: retired [de-identified] : 4/26/24Here for follow-up. Has slight increases in discomfort soreness in low back after increasing activity including twists and weight crunches 2 days in a  row. no radicular complaints.   04/17/2024: here for fu, reports pain continues to improve and has been able to increase activity with occasional low back pain which resolved with motrin. Denies radicular complaints. Denies wound drainage, fever, chills.   3/6/24 Doing well.  Increased activity walking over a mile a day. Has been doing inclined treadmill for 45 minutes.  Has mild back soreness at times.   2/7/24  Patient doing well. No radicular complaints.  Mild low back pain intermittently. Taking OTC Nsaids PRN.   01/10/2024: states he had a painful episode last month and received prior treatment at  in [place]. wants surgical consult.  Got HIREN x 1 with mild relief for 6 weeks but pain has recurred.    12/06/2023: pain has improved after receiving epidural by Dr. Estes.   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 No with hx of back pain improved with LESI. Patient was taking motrin prn then transitioned to course of MDP, tizanadine, tramadol and gabapentin, with no improvement.  Patient reports Left sided low back pain with LLE radicular pain and numbness traveling laterally down leg. Worse with ambulation, improves with rest.   MRI of lumbar spine completed on 10/9/23 L3-4 HNP with extruded herniation   PMH: HTN, HYPERLIPIDEMIA, BPH Allergies: NKDA Occupation: Retired   04/26/2024: here to follow up on the L-spine. c/o worseing pain that started 1 week ago after excercising.  [] : Post Surgical Visit: no [FreeTextEntry1] : left hip

## 2024-04-29 ENCOUNTER — APPOINTMENT (OUTPATIENT)
Dept: ORTHOPEDIC SURGERY | Facility: CLINIC | Age: 69
End: 2024-04-29
Payer: MEDICARE

## 2024-04-29 PROCEDURE — 20610 DRAIN/INJ JOINT/BURSA W/O US: CPT | Mod: RT

## 2024-04-29 PROCEDURE — 99213 OFFICE O/P EST LOW 20 MIN: CPT | Mod: 25

## 2024-04-29 NOTE — ASSESSMENT
[FreeTextEntry1] : Reviewed MRI in detail.  His symptoms today are predominantly patellofemoral arthritis exacerbation.  He has bone-on-bone patellofemoral joint on MRI.  There is a slight partial radial tear medially but again his symptoms are predominantly anteriorly.  Not interested in surgery at this time.  Will start Euflexxa series.  Follow-up after his summer trip to [place]

## 2024-04-29 NOTE — HISTORY OF PRESENT ILLNESS
[de-identified] : 4/8/24: 69yo M here for right knee pain that began 3 days ago after inclined walking on a treadmill for 30 mins. Pain is constant and anterior. Denies buckling/locking/giving out. No formal treatment so far. Denies prior Hx.   4/19/24: here to follow up on right knee. CSI from 4/8/24 with no relief. Swelling. Increased pain with walking.   4/29/24: here to review MRI

## 2024-04-29 NOTE — PROCEDURE
[FreeTextEntry3] : Large joint injection was performed of the RIGHT knee.  The indication for this procedure was pain, inflammation and x-ray evidence of Osteoarthritis on this or prior visit. The site was prepped with alcohol, betadine, ethyl chloride sprayed topically and sterile technique used.  An injection of EUFLEXXA 2ml, series #1 was used.   Patient was advised to call if redness, pain or fever occur, apply ice for 15 minutes out of every hour for the next 12-24 hours as tolerated and patient was advised to rest the joint(s) for 2 days. Patient has tried OTC's including aspirin, Ibuprofen, Aleve, etc or prescription NSAIDS, and/or exercises at home and/or physical therapy without satisfactory response, patient had decreased mobility in the joint and the risks benefits, and alternatives have been discussed, and verbal consent was obtained.

## 2024-05-06 ENCOUNTER — APPOINTMENT (OUTPATIENT)
Dept: ORTHOPEDIC SURGERY | Facility: CLINIC | Age: 69
End: 2024-05-06
Payer: MEDICARE

## 2024-05-06 DIAGNOSIS — S83.231D COMPLEX TEAR OF MEDIAL MENISCUS, CURRENT INJURY, RIGHT KNEE, SUBSEQUENT ENCOUNTER: ICD-10-CM

## 2024-05-06 PROCEDURE — 20610 DRAIN/INJ JOINT/BURSA W/O US: CPT | Mod: RT

## 2024-05-06 RX ORDER — MELOXICAM 15 MG/1
15 TABLET ORAL DAILY
Qty: 30 | Refills: 0 | Status: COMPLETED | COMMUNITY
Start: 2024-05-06 | End: 2024-06-05

## 2024-05-06 NOTE — PROCEDURE
[FreeTextEntry3] : Large joint injection was performed of the RIGHT knee.  The indication for this procedure was pain, inflammation and x-ray evidence of Osteoarthritis on this or prior visit. The site was prepped with alcohol, betadine, ethyl chloride sprayed topically and sterile technique used.  An injection of EUFLEXXA 2ml, series #2 was used.   Patient was advised to call if redness, pain or fever occur, apply ice for 15 minutes out of every hour for the next 12-24 hours as tolerated and patient was advised to rest the joint(s) for 2 days. Patient has tried OTC's including aspirin, Ibuprofen, Aleve, etc or prescription NSAIDS, and/or exercises at home and/or physical therapy without satisfactory response, patient had decreased mobility in the joint and the risks benefits, and alternatives have been discussed, and verbal consent was obtained.

## 2024-05-07 ENCOUNTER — APPOINTMENT (OUTPATIENT)
Dept: ORTHOPEDIC SURGERY | Facility: CLINIC | Age: 69
End: 2024-05-07
Payer: MEDICARE

## 2024-05-07 VITALS — WEIGHT: 185 LBS | BODY MASS INDEX: 29.03 KG/M2 | HEIGHT: 67 IN

## 2024-05-07 DIAGNOSIS — M62.830 MUSCLE SPASM OF BACK: ICD-10-CM

## 2024-05-07 DIAGNOSIS — Z98.890 OTHER SPECIFIED POSTPROCEDURAL STATES: ICD-10-CM

## 2024-05-07 DIAGNOSIS — M76.02 GLUTEAL TENDINITIS, LEFT HIP: ICD-10-CM

## 2024-05-07 PROCEDURE — 20552 NJX 1/MLT TRIGGER POINT 1/2: CPT

## 2024-05-07 PROCEDURE — 99213 OFFICE O/P EST LOW 20 MIN: CPT | Mod: 25

## 2024-05-07 NOTE — PHYSICAL EXAM
[] : achilles and patella reflexes are symmetrical [FreeTextEntry3] : lipoma over left posterior iliac crest

## 2024-05-07 NOTE — PROCEDURE
[Trigger point 1-2 muscle groups] : trigger point 1-2 muscle groups [Left] : of the left [Lumbar paraspinal muscle] : lumbar paraspinal muscle [Pain] : pain [Inflammation] : inflammation [Alcohol] : alcohol [Betadine] : betadine [Ethyl Chloride sprayed topically] : ethyl chloride sprayed topically [Sterile technique used] : sterile technique used [___ cc    1%] : Lidocaine ~Vcc of 1%  [___ cc    40mg] : Triamcinolone (Kenalog) ~Vcc of 40 mg  [] : Patient tolerated procedure well [Call if redness, pain or fever occur] : call if redness, pain or fever occur [Apply ice for 15min out of every hour for the next 12-24 hours as tolerated] : apply ice for 15 minutes out of every hour for the next 12-24 hours as tolerated

## 2024-05-07 NOTE — HISTORY OF PRESENT ILLNESS
[Lower back] : lower back [Dull/Aching] : dull/aching [Localized] : localized [de-identified] : Had spine surgery 6 months ago, has been getting viscoinjections for right knee OA, doing PT for both, had some increasing left buttock pain, was hoping to get TPI as he is going away for a month [] : no

## 2024-05-13 ENCOUNTER — APPOINTMENT (OUTPATIENT)
Dept: ORTHOPEDIC SURGERY | Facility: CLINIC | Age: 69
End: 2024-05-13
Payer: MEDICARE

## 2024-05-13 VITALS — HEIGHT: 67 IN | BODY MASS INDEX: 29.03 KG/M2 | WEIGHT: 185 LBS

## 2024-05-13 DIAGNOSIS — M17.11 UNILATERAL PRIMARY OSTEOARTHRITIS, RIGHT KNEE: ICD-10-CM

## 2024-05-13 PROCEDURE — 20610 DRAIN/INJ JOINT/BURSA W/O US: CPT | Mod: RT

## 2024-05-13 NOTE — HISTORY OF PRESENT ILLNESS
[3] : 3 [Euflexxa] : Euflexxa [de-identified] : 05/13/2024: euflexxa #3  5/6/24: Here for euflexxa #2.  [de-identified] : right knee  [de-identified] : 5-6-24

## 2024-05-13 NOTE — PROCEDURE
[FreeTextEntry3] : Large joint injection was performed of the RIGHT knee.  The indication for this procedure was pain, inflammation and x-ray evidence of Osteoarthritis on this or prior visit. The site was prepped with alcohol, betadine, ethyl chloride sprayed topically and sterile technique used.  An injection of EUFLEXXA 2ml, series #3 was used.   Patient was advised to call if redness, pain or fever occur, apply ice for 15 minutes out of every hour for the next 12-24 hours as tolerated and patient was advised to rest the joint(s) for 2 days. Patient has tried OTC's including aspirin, Ibuprofen, Aleve, etc or prescription NSAIDS, and/or exercises at home and/or physical therapy without satisfactory response, patient had decreased mobility in the joint and the risks benefits, and alternatives have been discussed, and verbal consent was obtained.

## 2024-07-15 ENCOUNTER — APPOINTMENT (OUTPATIENT)
Dept: ORTHOPEDIC SURGERY | Facility: CLINIC | Age: 69
End: 2024-07-15
Payer: MEDICARE

## 2024-07-15 DIAGNOSIS — S83.231D COMPLEX TEAR OF MEDIAL MENISCUS, CURRENT INJURY, RIGHT KNEE, SUBSEQUENT ENCOUNTER: ICD-10-CM

## 2024-07-15 DIAGNOSIS — M17.11 UNILATERAL PRIMARY OSTEOARTHRITIS, RIGHT KNEE: ICD-10-CM

## 2024-07-15 PROCEDURE — 99214 OFFICE O/P EST MOD 30 MIN: CPT | Mod: 57

## 2024-07-15 RX ORDER — MELOXICAM 15 MG/1
15 TABLET ORAL DAILY
Qty: 30 | Refills: 1 | Status: ACTIVE | COMMUNITY
Start: 2024-07-15 | End: 2024-09-13

## 2024-07-15 RX ORDER — TRAMADOL HYDROCHLORIDE 50 MG/1
50 TABLET, COATED ORAL EVERY 6 HOURS
Qty: 20 | Refills: 0 | Status: ACTIVE | COMMUNITY
Start: 2024-07-15 | End: 1900-01-01

## 2024-07-24 ENCOUNTER — APPOINTMENT (OUTPATIENT)
Dept: ORTHOPEDIC SURGERY | Facility: CLINIC | Age: 69
End: 2024-07-24
Payer: MEDICARE

## 2024-07-24 DIAGNOSIS — M51.26 OTHER INTERVERTEBRAL DISC DISPLACEMENT, LUMBAR REGION: ICD-10-CM

## 2024-07-24 DIAGNOSIS — Z98.890 OTHER SPECIFIED POSTPROCEDURAL STATES: ICD-10-CM

## 2024-07-24 PROCEDURE — 99213 OFFICE O/P EST LOW 20 MIN: CPT

## 2024-07-24 NOTE — ASSESSMENT
[FreeTextEntry1] : 68M with low back pain and LLE radic.   L3-4 HNP with extruded fragment . Doing well.  No restrictions at this time.,  No pain in back. Doing well  FU 6 months

## 2024-07-24 NOTE — HISTORY OF PRESENT ILLNESS
[Dull/Aching] : dull/aching [Localized] : localized [Standing] : standing [Walking] : walking [Stairs] : stairs [Retired] : Work status: retired [de-identified] : 7/24/24 Here for follow-up.  NO pain in low back  no radicular complaints. having current knee issues. seeing daniel   4/26/24Hen for follow-up. Has slight increases in discomfort soreness in low back after increasing activity including twists and weight crunches 2 days in a  row. no radicular complaints.   04/17/2024: here for fu, reports pain continues to improve and has been able to increase activity with occasional low back pain which resolved with motrin. Denies radicular complaints. Denies wound drainage, fever, chills.   3/6/24 Doing well.  Increased activity walking over a mile a day. Has been doing inclined treadmill for 45 minutes.  Has mild back soreness at times.   2/7/24  Patient doing well. No radicular complaints.  Mild low back pain intermittently. Taking OTC Nsaids PRN.   01/10/2024: states he had a painful episode last month and received prior treatment at  in [place]. wants surgical consult.  Got HIREN x 1 with mild relief for 6 weeks but pain has recurred.    12/06/2023: pain has improved after receiving epidural by Dr. Estes.   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 No with hx of back pain improved with LESI. Patient was taking motrin prn then transitioned to course of MDP, tizanadine, tramadol and gabapentin, with no improvement.  Patient reports Left sided low back pain with LLE radicular pain and numbness traveling laterally down leg. Worse with ambulation, improves with rest.   MRI of lumbar spine completed on 10/9/23 L3-4 HNP with extruded herniation   PMH: HTN, HYPERLIPIDEMIA, BPH Allergies: NKDA Occupation: Retired   04/26/2024: here to follow up on the L-spine. c/o worseing pain that started 1 week ago after excercising.   07/24/2024 Pt is here for fu, doing very well. No changes.  [] : Post Surgical Visit: no [FreeTextEntry1] : left hip

## 2024-09-10 ENCOUNTER — APPOINTMENT (OUTPATIENT)
Age: 69
End: 2024-09-10
Payer: MEDICARE

## 2024-09-10 PROCEDURE — 29881 ARTHRS KNE SRG MNISECTMY M/L: CPT | Mod: RT

## 2024-09-10 PROCEDURE — 29879 ARTHRS KNE SRG ABRASJ ARTHRP: CPT | Mod: AS,RT

## 2024-09-10 PROCEDURE — 29881 ARTHRS KNE SRG MNISECTMY M/L: CPT | Mod: AS,RT

## 2024-09-10 PROCEDURE — 29879 ARTHRS KNE SRG ABRASJ ARTHRP: CPT | Mod: RT

## 2024-09-10 RX ORDER — OXYCODONE AND ACETAMINOPHEN 5; 325 MG/1; MG/1
5-325 TABLET ORAL
Qty: 10 | Refills: 0 | Status: ACTIVE | COMMUNITY
Start: 2024-09-10 | End: 1900-01-01

## 2024-09-16 ENCOUNTER — APPOINTMENT (OUTPATIENT)
Dept: ORTHOPEDIC SURGERY | Facility: CLINIC | Age: 69
End: 2024-09-16
Payer: MEDICARE

## 2024-09-16 DIAGNOSIS — S83.231D COMPLEX TEAR OF MEDIAL MENISCUS, CURRENT INJURY, RIGHT KNEE, SUBSEQUENT ENCOUNTER: ICD-10-CM

## 2024-09-16 DIAGNOSIS — M17.11 UNILATERAL PRIMARY OSTEOARTHRITIS, RIGHT KNEE: ICD-10-CM

## 2024-09-16 PROCEDURE — 99024 POSTOP FOLLOW-UP VISIT: CPT

## 2024-09-16 NOTE — IMAGING
[de-identified] : Knee Exam:  No effusion or warmth Clean and dry incisions, sutures in place. Limited ROM compatible with recent surgery Negative Caitlin Motor and sensory intact distally Mildly antalgic gait

## 2024-09-16 NOTE — ASSESSMENT
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  He reports going in the pool shortly after surgery despite my instructions and clear recommendations.  No evidence of infection at this time but will need to monitor this carefully.  We did discuss referral for total knee arthroplasty given the amount of arthritis noted intraoperatively.  Follow-up in 3 to 4 weeks to reassess.  Carefully reviewed precautions and restrictions again.

## 2024-09-16 NOTE — HISTORY OF PRESENT ILLNESS
[3] : 3 [Euflexxa] : Euflexxa [de-identified] : 05/13/2024: euflexxa #3  5/6/24: Here for euflexxa #2.   7.15.24 Patient here for right knee pain. Patient states the pain came back after he was in [place] for about 30 days. Patient gets relief from physical therapy  9.16.24 Patient here for right knee pain. Post op #1. DOS: 9.10.24 [de-identified] : 5-6-24 [de-identified] : right knee

## 2024-09-18 ENCOUNTER — APPOINTMENT (OUTPATIENT)
Dept: ORTHOPEDIC SURGERY | Facility: CLINIC | Age: 69
End: 2024-09-18

## 2024-10-28 ENCOUNTER — APPOINTMENT (OUTPATIENT)
Dept: ORTHOPEDIC SURGERY | Facility: CLINIC | Age: 69
End: 2024-10-28
Payer: MEDICARE

## 2024-10-28 VITALS — BODY MASS INDEX: 30.61 KG/M2 | HEIGHT: 67 IN | WEIGHT: 195 LBS

## 2024-10-28 DIAGNOSIS — M17.11 UNILATERAL PRIMARY OSTEOARTHRITIS, RIGHT KNEE: ICD-10-CM

## 2024-10-28 PROCEDURE — 99024 POSTOP FOLLOW-UP VISIT: CPT

## 2024-11-18 ENCOUNTER — APPOINTMENT (OUTPATIENT)
Dept: ORTHOPEDIC SURGERY | Facility: CLINIC | Age: 69
End: 2024-11-18
Payer: MEDICARE

## 2024-11-18 DIAGNOSIS — S83.231D COMPLEX TEAR OF MEDIAL MENISCUS, CURRENT INJURY, RIGHT KNEE, SUBSEQUENT ENCOUNTER: ICD-10-CM

## 2024-11-18 DIAGNOSIS — E66.9 OBESITY, UNSPECIFIED: ICD-10-CM

## 2024-11-18 DIAGNOSIS — M25.461 EFFUSION, RIGHT KNEE: ICD-10-CM

## 2024-11-18 PROCEDURE — 20611 DRAIN/INJ JOINT/BURSA W/US: CPT | Mod: 79,RT

## 2024-11-25 ENCOUNTER — APPOINTMENT (OUTPATIENT)
Dept: ORTHOPEDIC SURGERY | Facility: CLINIC | Age: 69
End: 2024-11-25
Payer: MEDICARE

## 2024-11-25 VITALS — HEIGHT: 67 IN | WEIGHT: 195 LBS | BODY MASS INDEX: 30.61 KG/M2

## 2024-11-25 PROCEDURE — 20611 DRAIN/INJ JOINT/BURSA W/US: CPT | Mod: RT

## 2024-12-02 ENCOUNTER — APPOINTMENT (OUTPATIENT)
Dept: ORTHOPEDIC SURGERY | Facility: CLINIC | Age: 69
End: 2024-12-02
Payer: MEDICARE

## 2024-12-02 DIAGNOSIS — M17.11 UNILATERAL PRIMARY OSTEOARTHRITIS, RIGHT KNEE: ICD-10-CM

## 2024-12-02 PROCEDURE — 20610 DRAIN/INJ JOINT/BURSA W/O US: CPT | Mod: RT

## 2025-01-08 ENCOUNTER — APPOINTMENT (OUTPATIENT)
Dept: ORTHOPEDIC SURGERY | Facility: CLINIC | Age: 70
End: 2025-01-08

## 2025-03-28 ENCOUNTER — APPOINTMENT (OUTPATIENT)
Dept: ORTHOPEDIC SURGERY | Facility: CLINIC | Age: 70
End: 2025-03-28
Payer: MEDICARE

## 2025-03-28 VITALS — WEIGHT: 195 LBS | BODY MASS INDEX: 30.61 KG/M2 | HEIGHT: 67 IN

## 2025-03-28 DIAGNOSIS — M17.11 UNILATERAL PRIMARY OSTEOARTHRITIS, RIGHT KNEE: ICD-10-CM

## 2025-03-28 DIAGNOSIS — E66.9 OBESITY, UNSPECIFIED: ICD-10-CM

## 2025-03-28 PROCEDURE — 20610 DRAIN/INJ JOINT/BURSA W/O US: CPT | Mod: RT

## 2025-03-28 PROCEDURE — J3490M: CUSTOM | Mod: NC,JZ

## 2025-03-28 PROCEDURE — 99213 OFFICE O/P EST LOW 20 MIN: CPT | Mod: 25

## 2025-04-16 NOTE — BRIEF OPERATIVE NOTE - NSICDXBRIEFPOSTOP_GEN_ALL_CORE_FT
Quality 226: Preventive Care And Screening: Tobacco Use: Screening And Cessation Intervention: Patient screened for tobacco use and is an ex/non-smoker Detail Level: Detailed Quality 130: Documentation Of Current Medications In The Medical Record: Current Medications Documented Quality 431: Preventive Care And Screening: Unhealthy Alcohol Use - Screening: Patient not identified as an unhealthy alcohol user when screened for unhealthy alcohol use using a systematic screening method POST-OP DIAGNOSIS:  Herniated nucleus pulposus, L3-4 22-Jan-2024 10:34:22  Jasen Sotelo

## 2025-08-01 NOTE — ASU DISCHARGE PLAN (ADULT/PEDIATRIC) - SIGNS AND SYMPTOMS OF INFECTION: FEVER, REDNESS, SWELLING, FOUL SMELLING DISCHARGE
Anthony states that recently he has noticed many changes in his mother.  States he feels that her dementia may have progressed.  States he sees that she is more confused.he also notes that she is not eating as much, and sleeping a lot. Looks down at times, seems like she is sleeping but her eyes are open.  Has noticed more frequent accidents, and incontinence of bladder and bowel.  Anthony states that while they were at [facility], they were told that maybe hospice would be an option for her.  Anthony had not thought about hospice before but now is considering it.  Discussed the pros and cons of hospice.  Anthony is agreeable to hospice being ordered.  Discussed that he does not need to sign on to hospice until after the appointment with oncology.  Anthony agrees.  He is agreeable to proceed.   Statement Selected

## (undated) DEVICE — LIJ-METRX INSTRUMENT TRAY: Type: DURABLE MEDICAL EQUIPMENT

## (undated) DEVICE — DRAPE COVER SNAP 36X30"

## (undated) DEVICE — SOL IRR POUR H2O 500ML

## (undated) DEVICE — Device

## (undated) DEVICE — KIT MAXCESS MAS TLIF 2

## (undated) DEVICE — TUBING FOR SMOKE EVACUATOR (PURPLE END)

## (undated) DEVICE — LIJ-METRX II OUTER CASE FLEX ARM: Type: DURABLE MEDICAL EQUIPMENT

## (undated) DEVICE — DRAPE C ARM C-ARMOUR

## (undated) DEVICE — CANISTER DISPOSABLE THIN WALL 3000CC

## (undated) DEVICE — SUCTION YANKAUER NO CONTROL VENT

## (undated) DEVICE — GLV 7.5 PROTEXIS (CREAM) MICRO

## (undated) DEVICE — SUT VICRYL 0 27" UR-6

## (undated) DEVICE — NDL HYPO REGULAR BEVEL 25G X 1.5" (BLUE)

## (undated) DEVICE — SUT VICRYL 3-0 18" SH UNDYED (POP-OFF)

## (undated) DEVICE — DRSG DERMABOND 0.7ML

## (undated) DEVICE — NDL HYPO SAFE 21G X 1.5" (GREEN)

## (undated) DEVICE — GOWN XL

## (undated) DEVICE — SUT MONOCRYL 4-0 27" PS-2 UNDYED

## (undated) DEVICE — GOWN XXL

## (undated) DEVICE — POSITIONER FOAM EGG CRATE ULNAR 2PCS (PINK)

## (undated) DEVICE — ELCTR BOVIE PENCIL SMOKE EVACUATION

## (undated) DEVICE — PACK NEURO MINOR

## (undated) DEVICE — DRAPE TOWEL BLUE STICKY

## (undated) DEVICE — POSITIONER CUSHION INSERT PRONE VIEW LG

## (undated) DEVICE — ELCTR BOVIE PENCIL BLADE 10FT

## (undated) DEVICE — FOLEY CATH 2-WAY 16FR 5CC LATEX COUDE RED

## (undated) DEVICE — DRAPE 3/4 SHEET 52X76"

## (undated) DEVICE — DRAPE MAYO STAND 23"

## (undated) DEVICE — DRSG CURITY GAUZE SPONGE 4 X 4" 12-PLY

## (undated) DEVICE — SUT SILK 2-0 18" FS

## (undated) DEVICE — WARMING BLANKET UPPER ADULT

## (undated) DEVICE — PREP DURAPREP 26CC

## (undated) DEVICE — GLV 7.5 PROTEXIS (BLUE)

## (undated) DEVICE — POSITIONER STRAP ARMBOARD VELCRO TS-30

## (undated) DEVICE — TAPE SILK 3"

## (undated) DEVICE — DRSG BENZOIN 0.6CC

## (undated) DEVICE — LABELS BLANK W PEN

## (undated) DEVICE — SUT VICRYL 0 18" CT-1 UNDYED (POP-OFF)

## (undated) DEVICE — POSITIONER JACKSON TABLE CHEST, HIP, THIGH PADS, ARM CRADLE

## (undated) DEVICE — ELCTR BOVIE TIP BLADE INSULATED 2.75" EDGE

## (undated) DEVICE — GLV 8 PROTEXIS (WHITE)

## (undated) DEVICE — BIPOLAR FORCEP STRYKER STANDARD 8" X 1MM (YELLOW)

## (undated) DEVICE — DRAPE GENERAL ENDOSCOPY

## (undated) DEVICE — DRAPE TOWEL BLUE 17" X 24"

## (undated) DEVICE — LIJ-METRX SET LARGE TUBES: Type: DURABLE MEDICAL EQUIPMENT

## (undated) DEVICE — DRAPE C ARM BAND BAG

## (undated) DEVICE — GLV 9 PROTEXIS (WHITE)

## (undated) DEVICE — ELCTR GROUNDING PAD ADULT COVIDIEN

## (undated) DEVICE — MIDAS REX LEGEND MATCH HEAD FLUTED LG BORE 3.0MM X 14CM

## (undated) DEVICE — MIDAS REX LEGEND LUBRICANT DIFFUSER CARTRIDGE

## (undated) DEVICE — DRAPE BACK TABLE COVER 44X90"

## (undated) DEVICE — DRAPE INSTRUMENT POUCH 6.75" X 11"

## (undated) DEVICE — LIJ-METRX SET TUBES 26MM: Type: DURABLE MEDICAL EQUIPMENT

## (undated) DEVICE — VENODYNE/SCD SLEEVE CALF MEDIUM

## (undated) DEVICE — DRSG TELFA 3 X 8

## (undated) DEVICE — SUT VICRYL 2-0 18" CP-2 UNDYED (POP-OFF)

## (undated) DEVICE — FOLEY TRAY 16FR 5CC LF UMETER CLOSED

## (undated) DEVICE — ELCTR BOVIE TIP BLADE INSULATED 6.5" EDGE

## (undated) DEVICE — SOL IRR POUR NS 0.9% 1000ML